# Patient Record
Sex: MALE | Race: WHITE | NOT HISPANIC OR LATINO | Employment: FULL TIME | URBAN - METROPOLITAN AREA
[De-identification: names, ages, dates, MRNs, and addresses within clinical notes are randomized per-mention and may not be internally consistent; named-entity substitution may affect disease eponyms.]

---

## 2019-08-25 ENCOUNTER — HOSPITAL ENCOUNTER (EMERGENCY)
Facility: HOSPITAL | Age: 44
Discharge: HOME | End: 2019-08-25
Attending: EMERGENCY MEDICINE | Admitting: EMERGENCY MEDICINE
Payer: COMMERCIAL

## 2019-08-25 ENCOUNTER — APPOINTMENT (EMERGENCY)
Dept: RADIOLOGY | Facility: HOSPITAL | Age: 44
End: 2019-08-25
Attending: EMERGENCY MEDICINE
Payer: COMMERCIAL

## 2019-08-25 VITALS
SYSTOLIC BLOOD PRESSURE: 129 MMHG | HEART RATE: 70 BPM | DIASTOLIC BLOOD PRESSURE: 89 MMHG | OXYGEN SATURATION: 98 % | BODY MASS INDEX: 31.52 KG/M2 | HEIGHT: 68 IN | RESPIRATION RATE: 16 BRPM | WEIGHT: 208 LBS

## 2019-08-25 DIAGNOSIS — R07.89 CHEST DISCOMFORT: Primary | ICD-10-CM

## 2019-08-25 LAB
ALBUMIN SERPL BCP-MCNC: 3.8 G/DL (ref 3.5–5)
ALP SERPL-CCNC: 47 U/L (ref 46–116)
ALT SERPL W P-5'-P-CCNC: 37 U/L (ref 12–78)
ANION GAP SERPL CALCULATED.3IONS-SCNC: 8 MMOL/L (ref 4–13)
AST SERPL W P-5'-P-CCNC: 19 U/L (ref 5–45)
BASOPHILS # BLD AUTO: 0.03 THOUSANDS/ΜL (ref 0–0.1)
BASOPHILS NFR BLD AUTO: 1 % (ref 0–1)
BILIRUB SERPL-MCNC: 0.9 MG/DL (ref 0.2–1)
BUN SERPL-MCNC: 15 MG/DL (ref 5–25)
CALCIUM SERPL-MCNC: 9 MG/DL (ref 8.3–10.1)
CHLORIDE SERPL-SCNC: 109 MMOL/L (ref 100–108)
CO2 SERPL-SCNC: 25 MMOL/L (ref 21–32)
CREAT SERPL-MCNC: 1.13 MG/DL (ref 0.6–1.3)
EOSINOPHIL # BLD AUTO: 0.13 THOUSAND/ΜL (ref 0–0.61)
EOSINOPHIL NFR BLD AUTO: 3 % (ref 0–6)
ERYTHROCYTE [DISTWIDTH] IN BLOOD BY AUTOMATED COUNT: 12.2 % (ref 11.6–15.1)
GFR SERPL CREATININE-BSD FRML MDRD: 79 ML/MIN/1.73SQ M
GLUCOSE SERPL-MCNC: 114 MG/DL (ref 65–140)
HCT VFR BLD AUTO: 44.6 % (ref 36.5–49.3)
HGB BLD-MCNC: 14.8 G/DL (ref 12–17)
IMM GRANULOCYTES # BLD AUTO: 0.03 THOUSAND/UL (ref 0–0.2)
IMM GRANULOCYTES NFR BLD AUTO: 1 % (ref 0–2)
LYMPHOCYTES # BLD AUTO: 1.27 THOUSANDS/ΜL (ref 0.6–4.47)
LYMPHOCYTES NFR BLD AUTO: 28 % (ref 14–44)
MCH RBC QN AUTO: 32 PG (ref 26.8–34.3)
MCHC RBC AUTO-ENTMCNC: 33.2 G/DL (ref 31.4–37.4)
MCV RBC AUTO: 96 FL (ref 82–98)
MONOCYTES # BLD AUTO: 0.52 THOUSAND/ΜL (ref 0.17–1.22)
MONOCYTES NFR BLD AUTO: 11 % (ref 4–12)
NEUTROPHILS # BLD AUTO: 2.62 THOUSANDS/ΜL (ref 1.85–7.62)
NEUTS SEG NFR BLD AUTO: 56 % (ref 43–75)
NRBC BLD AUTO-RTO: 0 /100 WBCS
PLATELET # BLD AUTO: 187 THOUSANDS/UL (ref 149–390)
PMV BLD AUTO: 10.9 FL (ref 8.9–12.7)
POTASSIUM SERPL-SCNC: 3.8 MMOL/L (ref 3.5–5.3)
PROT SERPL-MCNC: 7 G/DL (ref 6.4–8.2)
RBC # BLD AUTO: 4.63 MILLION/UL (ref 3.88–5.62)
SODIUM SERPL-SCNC: 142 MMOL/L (ref 136–145)
TROPONIN I SERPL-MCNC: <0.02 NG/ML
WBC # BLD AUTO: 4.6 THOUSAND/UL (ref 4.31–10.16)

## 2019-08-25 PROCEDURE — 71045 X-RAY EXAM CHEST 1 VIEW: CPT

## 2019-08-25 PROCEDURE — 93005 ELECTROCARDIOGRAM TRACING: CPT

## 2019-08-25 PROCEDURE — 80053 COMPREHEN METABOLIC PANEL: CPT | Performed by: EMERGENCY MEDICINE

## 2019-08-25 PROCEDURE — 85025 COMPLETE CBC W/AUTO DIFF WBC: CPT | Performed by: EMERGENCY MEDICINE

## 2019-08-25 PROCEDURE — 84484 ASSAY OF TROPONIN QUANT: CPT | Performed by: EMERGENCY MEDICINE

## 2019-08-25 PROCEDURE — 36415 COLL VENOUS BLD VENIPUNCTURE: CPT | Performed by: EMERGENCY MEDICINE

## 2019-08-25 PROCEDURE — 99285 EMERGENCY DEPT VISIT HI MDM: CPT

## 2019-08-25 RX ORDER — ATORVASTATIN CALCIUM 10 MG/1
10 TABLET, FILM COATED ORAL DAILY
COMMUNITY
End: 2022-06-27

## 2019-08-25 NOTE — ED PROVIDER NOTES
History  Chief Complaint   Patient presents with    Chest Pain     pt states he was sleeping and heard a popping and cracking in his chest and that's what woke him up, called the squad  pt doesn't think he is having any chest pain right now  Pt states he always felt lightheaded and laid back down and got diaphoretic     Pt in ER with c/o sudden onset of "crackling" in his chest  Symptoms were localized to the left side and didn't correlate with respiration  Pt states that the crackling woke him from sleep, and occurred a total of 8 - 10 times  He denies chest pain during the crackling and at the time of initial eval  Pt's wife states that she put her ear to his chest and also heard the noise  Pt got up, attempted to get dressed and he states that his hands got sweaty and he felt near syncopal            Prior to Admission Medications   Prescriptions Last Dose Informant Patient Reported? Taking?   atorvastatin (LIPITOR) 10 mg tablet   Yes Yes   Sig: Take 10 mg by mouth daily      Facility-Administered Medications: None       Past Medical History:   Diagnosis Date    Hyperlipidemia        Past Surgical History:   Procedure Laterality Date    CHOLECYSTECTOMY         No family history on file  I have reviewed and agree with the history as documented  Social History     Tobacco Use    Smoking status: Never Smoker   Substance Use Topics    Alcohol use: Yes     Comment: on occasion    Drug use: Never        Review of Systems   Constitutional: Negative for chills and fever  Respiratory: Negative for cough, shortness of breath and wheezing  Cardiovascular: Negative for chest pain and palpitations  Gastrointestinal: Negative for abdominal pain, constipation, diarrhea, nausea and vomiting  Genitourinary: Negative for dysuria, flank pain, hematuria and urgency  Musculoskeletal: Negative for back pain  Skin: Negative for color change and rash  Neurological: Positive for weakness     All other systems reviewed and are negative  Physical Exam  Physical Exam   Constitutional: He is oriented to person, place, and time  He appears well-developed and well-nourished  HENT:   Head: Normocephalic and atraumatic  Eyes: Pupils are equal, round, and reactive to light  EOM are normal    Cardiovascular: Normal rate, regular rhythm and normal heart sounds  Pulmonary/Chest: Effort normal and breath sounds normal  He has no decreased breath sounds  He has no wheezes  He has no rhonchi  He has no rales  Abdominal: Soft  Bowel sounds are normal  He exhibits no distension and no mass  There is no tenderness  There is no rebound and no guarding  Neurological: He is alert and oriented to person, place, and time  Skin: Skin is warm and dry  Psychiatric: His behavior is normal  Judgment and thought content normal  His affect is blunt  Nursing note and vitals reviewed        Vital Signs  ED Triage Vitals   Temp Pulse Respirations Blood Pressure SpO2   -- 08/25/19 0612 08/25/19 0612 08/25/19 0612 08/25/19 0612    60 18 132/90 99 %      Temp src Heart Rate Source Patient Position - Orthostatic VS BP Location FiO2 (%)   -- 08/25/19 0612 08/25/19 0612 08/25/19 0612 --    Monitor Lying Right arm       Pain Score       08/25/19 0609       No Pain           Vitals:    08/25/19 0612 08/25/19 0630 08/25/19 0727   BP: 132/90  129/89   Pulse: 60 66 70   Patient Position - Orthostatic VS: Lying  Lying         Visual Acuity      ED Medications  Medications - No data to display    Diagnostic Studies  Results Reviewed     Procedure Component Value Units Date/Time    Troponin I [[id number]]  (Normal) Collected:  08/25/19 0618    Lab Status:  Final result Specimen:  Blood from Arm, Left Updated:  08/25/19 0657     Troponin I <0 02 ng/mL     Comprehensive metabolic panel [[id number]]  (Abnormal) Collected:  08/25/19 0618    Lab Status:  Final result Specimen:  Blood from Arm, Left Updated:  08/25/19 0651     Sodium 142 mmol/L Potassium 3 8 mmol/L      Chloride 109 mmol/L      CO2 25 mmol/L      ANION GAP 8 mmol/L      BUN 15 mg/dL      Creatinine 1 13 mg/dL      Glucose 114 mg/dL      Calcium 9 0 mg/dL      AST 19 U/L      ALT 37 U/L      Alkaline Phosphatase 47 U/L      Total Protein 7 0 g/dL      Albumin 3 8 g/dL      Total Bilirubin 0 90 mg/dL      eGFR 79 ml/min/1 73sq m     Narrative:       [place] guidelines for Chronic Kidney Disease (CKD):     Stage 1 with normal or high GFR (GFR > 90 mL/min/1 73 square meters)    Stage 2 Mild CKD (GFR = 60-89 mL/min/1 73 square meters)    Stage 3A Moderate CKD (GFR = 45-59 mL/min/1 73 square meters)    Stage 3B Moderate CKD (GFR = 30-44 mL/min/1 73 square meters)    Stage 4 Severe CKD (GFR = 15-29 mL/min/1 73 square meters)    Stage 5 End Stage CKD (GFR <15 mL/min/1 73 square meters)  Note: GFR calculation is accurate only with a steady state creatinine    CBC and differential [[id number]] Collected:  08/25/19 0618    Lab Status:  Final result Specimen:  Blood from Arm, Left Updated:  08/25/19 0629     WBC 4 60 Thousand/uL      RBC 4 63 Million/uL      Hemoglobin 14 8 g/dL      Hematocrit 44 6 %      MCV 96 fL      MCH 32 0 pg      MCHC 33 2 g/dL      RDW 12 2 %      MPV 10 9 fL      Platelets 941 Thousands/uL      nRBC 0 /100 WBCs      Neutrophils Relative 56 %      Immat GRANS % 1 %      Lymphocytes Relative 28 %      Monocytes Relative 11 %      Eosinophils Relative 3 %      Basophils Relative 1 %      Neutrophils Absolute 2 62 Thousands/µL      Immature Grans Absolute 0 03 Thousand/uL      Lymphocytes Absolute 1 27 Thousands/µL      Monocytes Absolute 0 52 Thousand/µL      Eosinophils Absolute 0 13 Thousand/µL      Basophils Absolute 0 03 Thousands/µL                  XR chest 1 view portable   ED Interpretation by Jules Tang DO (08/25 7271)   nad                 Procedures  ECG 12 Lead Documentation Only  Date/Time: 8/25/2019 6:07 AM  Performed by: Alexandra Conte DO  Authorized by: Alexandra Conte DO     Indications / Diagnosis:  Chest discomfort  ECG reviewed by me, the ED Provider: yes    Patient location:  ED  Previous ECG:     Previous ECG:  Unavailable    Comparison to cardiac monitor: Yes    Interpretation:     Interpretation: normal    Rate:     ECG rate:  57bpm    ECG rate assessment: normal    Rhythm:     Rhythm: sinus rhythm    Ectopy:     Ectopy: none    QRS:     QRS axis:  Normal           ED Course         HEART Risk Score      Most Recent Value   History  0 Filed at: 08/25/2019 0713   ECG  0 Filed at: 08/25/2019 8027   Age  0 Filed at: 08/25/2019 2127   Risk Factors  1 Filed at: 08/25/2019 0713   Troponin  0 Filed at: 08/25/2019 0713   Heart Score Risk Calculator   History  0 Filed at: 08/25/2019 0713   ECG  0 Filed at: 08/25/2019 3713   Age  0 Filed at: 08/25/2019 2431   Risk Factors  1 Filed at: 08/25/2019 0713   Troponin  0 Filed at: 08/25/2019 4642   HEART Score  1 Filed at: 08/25/2019 2537   HEART Score  1 Filed at: 08/25/2019 7457                            MDM    Disposition  Final diagnoses:   Chest discomfort     Time reflects when diagnosis was documented in both MDM as applicable and the Disposition within this note     Time User Action Codes Description Comment    8/25/2019  7:16 AM Theron Gage Add [R07 89] Chest discomfort       ED Disposition     ED Disposition Condition Date/Time Comment    Discharge Stable Sun Aug 25, 2019  7:16 AM Treasure Kaur 27 discharge to home/self care              Follow-up Information     Follow up With Specialties Details Why Jeferson Swain MD Internal Medicine Schedule an appointment as soon as possible for a visit in 1 day for follow up Vanessa Liu 284  [phone]            Discharge Medication List as of 8/25/2019  7:18 AM      CONTINUE these medications which have NOT CHANGED    Details   atorvastatin (LIPITOR) 10 mg tablet Take 10 mg by mouth daily, Historical Med           No discharge procedures on file      ED Provider  Electronically Signed by           Joel Mclaughlin DO  08/25/19 6501

## 2019-08-26 LAB
ATRIAL RATE: 57 BPM
P AXIS: -2 DEGREES
PR INTERVAL: 148 MS
QRS AXIS: 82 DEGREES
QRSD INTERVAL: 82 MS
QT INTERVAL: 426 MS
QTC INTERVAL: 414 MS
T WAVE AXIS: 24 DEGREES
VENTRICULAR RATE: 57 BPM

## 2019-08-26 PROCEDURE — 93010 ELECTROCARDIOGRAM REPORT: CPT | Performed by: INTERNAL MEDICINE

## 2021-04-13 DIAGNOSIS — Z23 ENCOUNTER FOR IMMUNIZATION: ICD-10-CM

## 2021-04-25 ENCOUNTER — IMMUNIZATIONS (OUTPATIENT)
Dept: FAMILY MEDICINE CLINIC | Facility: HOSPITAL | Age: 46
End: 2021-04-25

## 2021-04-25 DIAGNOSIS — Z23 ENCOUNTER FOR IMMUNIZATION: Primary | ICD-10-CM

## 2021-04-25 PROCEDURE — 91300 SARS-COV-2 / COVID-19 MRNA VACCINE (PFIZER-BIONTECH) 30 MCG: CPT

## 2021-04-25 PROCEDURE — 0001A SARS-COV-2 / COVID-19 MRNA VACCINE (PFIZER-BIONTECH) 30 MCG: CPT

## 2021-05-18 ENCOUNTER — IMMUNIZATIONS (OUTPATIENT)
Dept: FAMILY MEDICINE CLINIC | Facility: HOSPITAL | Age: 46
End: 2021-05-18

## 2021-05-18 DIAGNOSIS — Z23 ENCOUNTER FOR IMMUNIZATION: Primary | ICD-10-CM

## 2021-05-18 PROCEDURE — 91300 SARS-COV-2 / COVID-19 MRNA VACCINE (PFIZER-BIONTECH) 30 MCG: CPT

## 2021-05-18 PROCEDURE — 0002A SARS-COV-2 / COVID-19 MRNA VACCINE (PFIZER-BIONTECH) 30 MCG: CPT

## 2022-06-23 ENCOUNTER — APPOINTMENT (EMERGENCY)
Dept: CT IMAGING | Facility: HOSPITAL | Age: 47
DRG: 696 | End: 2022-06-23
Payer: COMMERCIAL

## 2022-06-23 ENCOUNTER — HOSPITAL ENCOUNTER (INPATIENT)
Facility: HOSPITAL | Age: 47
LOS: 3 days | Discharge: ANOTHER HOSPITAL | DRG: 696 | End: 2022-06-27
Attending: EMERGENCY MEDICINE | Admitting: INTERNAL MEDICINE
Payer: COMMERCIAL

## 2022-06-23 ENCOUNTER — APPOINTMENT (OUTPATIENT)
Dept: CT IMAGING | Facility: HOSPITAL | Age: 47
DRG: 696 | End: 2022-06-23
Payer: COMMERCIAL

## 2022-06-23 DIAGNOSIS — N28.89 KIDNEY MASS: ICD-10-CM

## 2022-06-23 DIAGNOSIS — R31.9 HEMATURIA, UNSPECIFIED TYPE: Primary | ICD-10-CM

## 2022-06-23 DIAGNOSIS — R52 PAIN: ICD-10-CM

## 2022-06-23 DIAGNOSIS — N28.89 MASS OF RIGHT KIDNEY: ICD-10-CM

## 2022-06-23 PROBLEM — E78.2 MIXED HYPERLIPIDEMIA: Status: ACTIVE | Noted: 2019-04-22

## 2022-06-23 PROBLEM — R17 ELEVATED BILIRUBIN: Status: ACTIVE | Noted: 2022-06-23

## 2022-06-23 LAB
ALBUMIN SERPL BCP-MCNC: 4.5 G/DL (ref 3.5–5)
ALP SERPL-CCNC: 48 U/L (ref 34–104)
ALT SERPL W P-5'-P-CCNC: 27 U/L (ref 7–52)
ANION GAP SERPL CALCULATED.3IONS-SCNC: 6 MMOL/L (ref 4–13)
APTT PPP: 29 SECONDS (ref 23–37)
AST SERPL W P-5'-P-CCNC: 17 U/L (ref 13–39)
BACTERIA UR QL AUTO: ABNORMAL /HPF
BASOPHILS # BLD AUTO: 0.03 THOUSANDS/ΜL (ref 0–0.1)
BASOPHILS NFR BLD AUTO: 1 % (ref 0–1)
BILIRUB SERPL-MCNC: 1.49 MG/DL (ref 0.2–1)
BILIRUB UR QL STRIP: NEGATIVE
BUN SERPL-MCNC: 14 MG/DL (ref 5–25)
CALCIUM SERPL-MCNC: 9.1 MG/DL (ref 8.4–10.2)
CHLORIDE SERPL-SCNC: 105 MMOL/L (ref 96–108)
CK SERPL-CCNC: 105 U/L (ref 39–308)
CLARITY UR: ABNORMAL
CO2 SERPL-SCNC: 29 MMOL/L (ref 21–32)
COLOR UR: ABNORMAL
CREAT SERPL-MCNC: 1.05 MG/DL (ref 0.6–1.3)
EOSINOPHIL # BLD AUTO: 0.07 THOUSAND/ΜL (ref 0–0.61)
EOSINOPHIL NFR BLD AUTO: 1 % (ref 0–6)
ERYTHROCYTE [DISTWIDTH] IN BLOOD BY AUTOMATED COUNT: 12.7 % (ref 11.6–15.1)
GFR SERPL CREATININE-BSD FRML MDRD: 84 ML/MIN/1.73SQ M
GLUCOSE SERPL-MCNC: 118 MG/DL (ref 65–140)
GLUCOSE UR STRIP-MCNC: NEGATIVE MG/DL
HCT VFR BLD AUTO: 46.4 % (ref 36.5–49.3)
HGB BLD-MCNC: 15.9 G/DL (ref 12–17)
HGB UR QL STRIP.AUTO: ABNORMAL
IMM GRANULOCYTES # BLD AUTO: 0.04 THOUSAND/UL (ref 0–0.2)
IMM GRANULOCYTES NFR BLD AUTO: 1 % (ref 0–2)
INR PPP: 0.93 (ref 0.84–1.19)
KETONES UR STRIP-MCNC: NEGATIVE MG/DL
LACTATE SERPL-SCNC: 0.7 MMOL/L (ref 0.5–2)
LEUKOCYTE ESTERASE UR QL STRIP: NEGATIVE
LIPASE SERPL-CCNC: 87 U/L (ref 11–82)
LYMPHOCYTES # BLD AUTO: 1.19 THOUSANDS/ΜL (ref 0.6–4.47)
LYMPHOCYTES NFR BLD AUTO: 23 % (ref 14–44)
MCH RBC QN AUTO: 32.1 PG (ref 26.8–34.3)
MCHC RBC AUTO-ENTMCNC: 34.3 G/DL (ref 31.4–37.4)
MCV RBC AUTO: 94 FL (ref 82–98)
MONOCYTES # BLD AUTO: 0.43 THOUSAND/ΜL (ref 0.17–1.22)
MONOCYTES NFR BLD AUTO: 8 % (ref 4–12)
NEUTROPHILS # BLD AUTO: 3.39 THOUSANDS/ΜL (ref 1.85–7.62)
NEUTS SEG NFR BLD AUTO: 66 % (ref 43–75)
NITRITE UR QL STRIP: NEGATIVE
NON-SQ EPI CELLS URNS QL MICRO: ABNORMAL /HPF
NRBC BLD AUTO-RTO: 0 /100 WBCS
PH UR STRIP.AUTO: 6.5 [PH]
PLATELET # BLD AUTO: 225 THOUSANDS/UL (ref 149–390)
PMV BLD AUTO: 10.1 FL (ref 8.9–12.7)
POTASSIUM SERPL-SCNC: 3.8 MMOL/L (ref 3.5–5.3)
PROT SERPL-MCNC: 7.2 G/DL (ref 6.4–8.4)
PROT UR STRIP-MCNC: >=300 MG/DL
PROTHROMBIN TIME: 12.5 SECONDS (ref 11.6–14.5)
RBC # BLD AUTO: 4.96 MILLION/UL (ref 3.88–5.62)
RBC #/AREA URNS AUTO: ABNORMAL /HPF
SODIUM SERPL-SCNC: 140 MMOL/L (ref 135–147)
SP GR UR STRIP.AUTO: 1.02 (ref 1–1.03)
UROBILINOGEN UR QL STRIP.AUTO: 0.2 E.U./DL
WBC # BLD AUTO: 5.15 THOUSAND/UL (ref 4.31–10.16)
WBC #/AREA URNS AUTO: ABNORMAL /HPF

## 2022-06-23 PROCEDURE — 36415 COLL VENOUS BLD VENIPUNCTURE: CPT | Performed by: PHYSICIAN ASSISTANT

## 2022-06-23 PROCEDURE — 71250 CT THORAX DX C-: CPT

## 2022-06-23 PROCEDURE — 85025 COMPLETE CBC W/AUTO DIFF WBC: CPT | Performed by: PHYSICIAN ASSISTANT

## 2022-06-23 PROCEDURE — 85730 THROMBOPLASTIN TIME PARTIAL: CPT | Performed by: PHYSICIAN ASSISTANT

## 2022-06-23 PROCEDURE — 80053 COMPREHEN METABOLIC PANEL: CPT | Performed by: PHYSICIAN ASSISTANT

## 2022-06-23 PROCEDURE — 83605 ASSAY OF LACTIC ACID: CPT | Performed by: PHYSICIAN ASSISTANT

## 2022-06-23 PROCEDURE — 81001 URINALYSIS AUTO W/SCOPE: CPT | Performed by: PHYSICIAN ASSISTANT

## 2022-06-23 PROCEDURE — 99285 EMERGENCY DEPT VISIT HI MDM: CPT

## 2022-06-23 PROCEDURE — 82550 ASSAY OF CK (CPK): CPT | Performed by: PHYSICIAN ASSISTANT

## 2022-06-23 PROCEDURE — 74176 CT ABD & PELVIS W/O CONTRAST: CPT

## 2022-06-23 PROCEDURE — 83690 ASSAY OF LIPASE: CPT | Performed by: PHYSICIAN ASSISTANT

## 2022-06-23 PROCEDURE — 99285 EMERGENCY DEPT VISIT HI MDM: CPT | Performed by: PHYSICIAN ASSISTANT

## 2022-06-23 PROCEDURE — 85610 PROTHROMBIN TIME: CPT | Performed by: PHYSICIAN ASSISTANT

## 2022-06-23 PROCEDURE — 87086 URINE CULTURE/COLONY COUNT: CPT | Performed by: PHYSICIAN ASSISTANT

## 2022-06-23 PROCEDURE — G1004 CDSM NDSC: HCPCS

## 2022-06-23 RX ORDER — ACETAMINOPHEN 325 MG/1
650 TABLET ORAL EVERY 6 HOURS PRN
Status: DISCONTINUED | OUTPATIENT
Start: 2022-06-23 | End: 2022-06-28 | Stop reason: HOSPADM

## 2022-06-23 RX ORDER — ONDANSETRON 2 MG/ML
4 INJECTION INTRAMUSCULAR; INTRAVENOUS EVERY 6 HOURS PRN
Status: DISCONTINUED | OUTPATIENT
Start: 2022-06-23 | End: 2022-06-28 | Stop reason: HOSPADM

## 2022-06-24 ENCOUNTER — APPOINTMENT (OUTPATIENT)
Dept: MRI IMAGING | Facility: HOSPITAL | Age: 47
DRG: 696 | End: 2022-06-24
Payer: COMMERCIAL

## 2022-06-24 LAB
ABO GROUP BLD: NORMAL
ALBUMIN SERPL BCP-MCNC: 4.1 G/DL (ref 3.5–5)
ALP SERPL-CCNC: 41 U/L (ref 34–104)
ALT SERPL W P-5'-P-CCNC: 23 U/L (ref 7–52)
ANION GAP SERPL CALCULATED.3IONS-SCNC: 6 MMOL/L (ref 4–13)
AST SERPL W P-5'-P-CCNC: 15 U/L (ref 13–39)
BACTERIA UR CULT: NORMAL
BILIRUB DIRECT SERPL-MCNC: 0.15 MG/DL (ref 0–0.2)
BILIRUB SERPL-MCNC: 1.63 MG/DL (ref 0.2–1)
BLD GP AB SCN SERPL QL: NEGATIVE
BUN SERPL-MCNC: 11 MG/DL (ref 5–25)
CALCIUM SERPL-MCNC: 9.2 MG/DL (ref 8.4–10.2)
CHLORIDE SERPL-SCNC: 108 MMOL/L (ref 96–108)
CO2 SERPL-SCNC: 26 MMOL/L (ref 21–32)
CREAT SERPL-MCNC: 1.01 MG/DL (ref 0.6–1.3)
FLUAV RNA RESP QL NAA+PROBE: NEGATIVE
FLUBV RNA RESP QL NAA+PROBE: NEGATIVE
GFR SERPL CREATININE-BSD FRML MDRD: 88 ML/MIN/1.73SQ M
GLUCOSE SERPL-MCNC: 150 MG/DL (ref 65–140)
HCT VFR BLD AUTO: 43.1 % (ref 36.5–49.3)
HCT VFR BLD AUTO: 43.2 % (ref 36.5–49.3)
HGB BLD-MCNC: 15 G/DL (ref 12–17)
HGB BLD-MCNC: 15 G/DL (ref 12–17)
PLATELET # BLD AUTO: 215 THOUSANDS/UL (ref 149–390)
PMV BLD AUTO: 10.7 FL (ref 8.9–12.7)
POTASSIUM SERPL-SCNC: 3.7 MMOL/L (ref 3.5–5.3)
PROT SERPL-MCNC: 6.4 G/DL (ref 6.4–8.4)
RH BLD: POSITIVE
RSV RNA RESP QL NAA+PROBE: NEGATIVE
SARS-COV-2 RNA RESP QL NAA+PROBE: NEGATIVE
SODIUM SERPL-SCNC: 140 MMOL/L (ref 135–147)
SPECIMEN EXPIRATION DATE: NORMAL

## 2022-06-24 PROCEDURE — 80076 HEPATIC FUNCTION PANEL: CPT | Performed by: INTERNAL MEDICINE

## 2022-06-24 PROCEDURE — 85018 HEMOGLOBIN: CPT | Performed by: INTERNAL MEDICINE

## 2022-06-24 PROCEDURE — 86900 BLOOD TYPING SEROLOGIC ABO: CPT | Performed by: INTERNAL MEDICINE

## 2022-06-24 PROCEDURE — A9585 GADOBUTROL INJECTION: HCPCS | Performed by: NURSE PRACTITIONER

## 2022-06-24 PROCEDURE — 85014 HEMATOCRIT: CPT | Performed by: INTERNAL MEDICINE

## 2022-06-24 PROCEDURE — 80048 BASIC METABOLIC PNL TOTAL CA: CPT | Performed by: INTERNAL MEDICINE

## 2022-06-24 PROCEDURE — G1004 CDSM NDSC: HCPCS

## 2022-06-24 PROCEDURE — 74183 MRI ABD W/O CNTR FLWD CNTR: CPT

## 2022-06-24 PROCEDURE — 86901 BLOOD TYPING SEROLOGIC RH(D): CPT | Performed by: INTERNAL MEDICINE

## 2022-06-24 PROCEDURE — 0241U HB NFCT DS VIR RESP RNA 4 TRGT: CPT | Performed by: INTERNAL MEDICINE

## 2022-06-24 PROCEDURE — 36415 COLL VENOUS BLD VENIPUNCTURE: CPT | Performed by: INTERNAL MEDICINE

## 2022-06-24 PROCEDURE — 99222 1ST HOSP IP/OBS MODERATE 55: CPT | Performed by: UROLOGY

## 2022-06-24 PROCEDURE — 99223 1ST HOSP IP/OBS HIGH 75: CPT | Performed by: INTERNAL MEDICINE

## 2022-06-24 PROCEDURE — 86850 RBC ANTIBODY SCREEN: CPT | Performed by: INTERNAL MEDICINE

## 2022-06-24 PROCEDURE — 85049 AUTOMATED PLATELET COUNT: CPT | Performed by: INTERNAL MEDICINE

## 2022-06-24 RX ADMIN — GADOBUTROL 9 ML: 604.72 INJECTION INTRAVENOUS at 09:58

## 2022-06-24 NOTE — UTILIZATION REVIEW
Initial Clinical Review    Admission: Date/Time/Statement:  6/23/22 2219 Observation AND CHANGED 6/24/22 1041 INPATIENT RE: PATIENT PRESENTING WITH HEMATURIA FOUND TO  HAVE LARGE COMPLEX MASS IN RIGHT KIDNEY AND NEEDS ONGOING WORK UP INCLUDING MRI TO CHECK VENA CAVA FOR TUMOR THROMBUS AND POSSIBLE URGENT RIGHT SIDED RADICAL NEPHRECTOMY, POSSIBLE ROBOTIC, POSSIBLE HAND ASSIST, POSSIBLE OPEN PROCEDURE    06/24/22 1041  Inpatient Admission  Once        Transfer Service: Hospitalist       Question Answer Comment   Level of Care Med Surg    Estimated length of stay More than 2 Midnights    Certification I certify that inpatient services are medically necessary for this patient for a duration of greater than two midnights  See H&P and MD Progress Notes for additional information about the patient's course of treatment  06/24/22 1041       ED Arrival Information     Expected   -    Arrival   6/23/2022 19:53    Acuity   Urgent            Means of arrival   Walk-In    Escorted by   Spouse    Service   Hospitalist    Admission type   Urgent            Arrival complaint   blood in urine            Chief Complaint   Patient presents with    Blood in Urine     Patient complains of blood in his urine starting at 3:48 this AM  Patient states in one instance there was a clot present in the urine  Pt states no trauma to area and no history of and urologic problems  Denies urgency, frequency, or burning with urination  Initial Presentation: 55 y o  male from home to ED admitted to observation [address]  due to Mass of Right Kidney/Painless hematuria  Presented due to hematuria starting about 15 hours piror to arrival,  Has bloody urine and clots  On exam urine at bedside is red  UA > 300 protein, large occult blood  H&H 15 9/46  4  Ct abdomen showed large complex mass in right kidney  Plan is consult Urology    Monitor H&H and transfuse if hgb < 7       6/24/22 CHANGED TO INPATIENT:  Patient very worried about mass  Hematuria resolved  Exam no focal deficits  MRI showed large 13cm solid and cystic right renal mass and presumes cancer and needs surgical resection  Wants to go to [facility] for treatment  6/24/22 per Urology - Patient with gross abnormality of right kidney per imaging  Will need MRI abdomen to check vena cava for tumor thrombus  May need urgent right-sided radical nephrectomy, possible robotic, possible hand assist, possible open procedure  Trend BMP and H&H      6/25/22 - no further hematuria  Exam no focal deficits  Urology recommends transfer to [facility] for nephrectomy and biopsy  Patient prefers to be at [facility]  Patient is accepted by Dr Trista Ley at [facility]  Per patient access suspect transfer will not occur until 6/27/22 as patient needs medical insurance authorization  ED Triage Vitals [06/23/22 2003]   Temperature Pulse Respirations Blood Pressure SpO2   98 2 °F (36 8 °C) 76 18 136/95 97 %      Temp Source Heart Rate Source Patient Position - Orthostatic VS BP Location FiO2 (%)   Oral Monitor Sitting Right arm --      Pain Score       --          Wt Readings from Last 1 Encounters:   06/24/22 88 8 kg (195 lb 12 3 oz)     Additional Vital Signs:   06/25/22 0700 98 1 °F (36 7 °C) 75 18 126/85 101 98 % None (Room air) Sitting   06/24/22 2246 97 9 °F (36 6 °C) 74 18 121/82 96 95 % -- Lying   06/24/22 1515 98 5 °F (36 9 °C) 90 18 133/85 103 94 %         06/24/22 09:07:59 98 1 °F (36 7 °C) 87 16 134/94 107 93 % None (Room air) Lying   06/24/22 0545 -- 80 18 125/88 102 98 % None (Room air) Lying       Pertinent Labs/Diagnostic Test Results:   MRI abdomen w wo contrast   Final Result by Jennifer Turner DO (06/24 1052)      Large 13 cm solid and cystic right renal mass touching upon adjacent structures as above    Although conceivably this could represent an oncocytoma given absence of metastatic disease or enlarged lymphadenopathy as well as lack of aggressive local    features, however, renal cell carcinoma would look identical and cannot be distinguished by imaging alone  Surgical resection is warranted; presumed cancer until proven otherwise  Additional incidental findings as above  Workstation performed: [id number]         CT chest without contrast   Final Result by Vivek Griffith DO (06/23 2333)      No evidence of focal consolidation      Right renal mass as described on prior CT scan of the abdomen and pelvis from earlier today likely representing malignancy  Follow-up with MRI of the kidneys is recommended with contrast               Workstation performed: [id number]         CT abdomen pelvis wo contrast   Final Result by Vivek Griffith DO (06/23 2115)      Large complex mass in the right kidney highly suspicious for malignancy    Recommend MRI of the kidneys with contrast for further evaluation             I personally discussed this study with [address] on 6/23/2022 at 9:05 PM                      Workstation performed: [id number]           Results from last 7 days   Lab Units 06/24/22  1649   SARS-COV-2  Negative     Results from last 7 days   Lab Units 06/25/22  0513 06/24/22  0535 06/23/22 2024   WBC Thousand/uL 5 10  --  5 15   HEMOGLOBIN g/dL 15 8 15 0  15 0 15 9   HEMATOCRIT % 45 3 43 2  43 1 46 4   PLATELETS Thousands/uL 202 215 225   NEUTROS ABS Thousands/µL  --   --  3 39     Results from last 7 days   Lab Units 06/25/22  0513 06/24/22  0535 06/23/22 2024   SODIUM mmol/L 142 140 140   POTASSIUM mmol/L 4 3 3 7 3 8   CHLORIDE mmol/L 108 108 105   CO2 mmol/L 27 26 29   ANION GAP mmol/L 7 6 6   BUN mg/dL 13 11 14   CREATININE mg/dL 1 13 1 01 1 05   EGFR ml/min/1 73sq m 77 88 84   CALCIUM mg/dL 9 3 9 2 9 1     Results from last 7 days   Lab Units 06/25/22  0513 06/24/22  0535 06/23/22 2024   AST U/L 16 15 17   ALT U/L 23 23 27   ALK PHOS U/L 42 41 48   TOTAL PROTEIN g/dL 6 4 6 4 7 2   ALBUMIN g/dL 4 1 4 1 4 5   TOTAL BILIRUBIN mg/dL 2 01* 1 63* 1 49*   BILIRUBIN DIRECT mg/dL  --  0 15  --      Results from last 7 days   Lab Units 06/25/22  0513 06/24/22  0535 06/23/22 2024   GLUCOSE RANDOM mg/dL 102 150* 118     Results from last 7 days   Lab Units 06/23/22 2024   CK TOTAL U/L 105     Results from last 7 days   Lab Units 06/23/22 2024   PROTIME seconds 12 5   INR  0 93   PTT seconds 29     Results from last 7 days   Lab Units 06/23/22 2024   LACTIC ACID mmol/L 0 7     Results from last 7 days   Lab Units 06/23/22 2024   LIPASE u/L 87*     Results from last 7 days   Lab Units 06/23/22 2025   CLARITY UA  Cloudy   COLOR UA  Bloody   SPEC GRAV UA  1 020   PH UA  6 5   GLUCOSE UA mg/dl Negative   KETONES UA mg/dl Negative   BLOOD UA  Large*   PROTEIN UA mg/dl >=300*   NITRITE UA  Negative   BILIRUBIN UA  Negative   UROBILINOGEN UA E U /dl 0 2   LEUKOCYTES UA  Negative   WBC UA /hpf Field obscured, unable to enumerate*   RBC UA /hpf Innumerable*   BACTERIA UA /hpf None Seen   EPITHELIAL CELLS WET PREP /hpf None Seen     ED Treatment:   Medication Administration from 06/23/2022 1952 to 06/24/2022 1933     None        Past Medical History:   Diagnosis Date    Hyperlipidemia      Present on Admission:   Mixed hyperlipidemia      Admitting Diagnosis: Blood in urine [R31 9]  Kidney mass [N28 89]  Mass of right kidney [N28 89]  Hematuria, unspecified type [R31 9]  Age/Sex: 55 y o  male  Admission Orders:  Scheduled Medications:     Continuous IV Infusions: none      PRN Meds: not used   acetaminophen, 650 mg, Oral, Q6H PRN  ondansetron, 4 mg, Intravenous, Q6H PRN        IP CONSULT TO UROLOGY    Network Utilization Review Department  ATTENTION: Please call with any questions or concerns to [phone] and carefully listen to the prompts so that you are directed to the right person   All voicemails are confidential   Matilda Velazquez all requests for admission clinical reviews, approved or denied determinations and any other requests to dedicated fax number below belonging to the campus where the patient is receiving treatment   List of dedicated fax numbers for the Facilities:  [address] [place] [address] DENIALS (Administrative/Medical Necessity) [phone]   [address]  (Maternity/NICU/Pediatrics) [phone]   [address]  [address] [place] [id number] [place] [address] [address] [facility] [phone] no

## 2022-06-24 NOTE — ASSESSMENT & PLAN NOTE
POA : Painless hematuria started today with passing 2 clots  No other urinary symptoms  · CT scan of abdomen demonstrated large complex mass in the right kidney highly suspicious for malignancy  · Urology consult  · Will defer ordering MRI with contrast of the kidney to urology team if required  · Per discussion with urology,If emergent nephrectomy is needed then will have to transfer to [facility]  · Rest of plan as below

## 2022-06-24 NOTE — QUICK NOTE
Full consultation to follow later today:  Called about this patient with gross hematuria, noncontrast films reviewed, gross abnormalities of the right kidney are noted  Further staging with contrast is necessary in the form of an MRI of the abdomen pelvis with contrast to assess the vena cava for any potential tumor thrombus  This patient will require transfer to the [address] [place] [address] in the coming days and we will need to look into an urgent right-sided radical nephrectomy, possible robotic, possible hand assist, possible open procedure  Please trend his kidney function, please obtain a type and cross for 2 units of packed red blood cells as he may require transfusion while awaiting nephrectomy    Please optimize for general anesthesia and major surgery

## 2022-06-24 NOTE — ED PROVIDER NOTES
History  Chief Complaint   Patient presents with    Blood in Urine     Patient complains of blood in his urine starting at 3:48 this AM  Patient states in one instance there was a clot present in the urine  Pt states no trauma to area and no history of and urologic problems  Denies urgency, frequency, or burning with urination  Patient is a 55 YOM presenting to the ER for evaluation of hematuria  He states early this morning around 4am he developed gross hematuria  He believes it is present during his entire stream but is not entirely sure  He states every time he urinates since that time it has been bloody  Clots also present  He denies any history of this in the past  He denies any fever, chills, nausea, vomiting, diarrhea, chest pain, shortness of breath, use of blood thinners, dysuria, frequency, trauma  Patient does not smoke  History provided by:  Patient   used: No        Prior to Admission Medications   Prescriptions Last Dose Informant Patient Reported? Taking?   atorvastatin (LIPITOR) 10 mg tablet   Yes No   Sig: Take 10 mg by mouth daily      Facility-Administered Medications: None       Past Medical History:   Diagnosis Date    Hyperlipidemia        Past Surgical History:   Procedure Laterality Date    CHOLECYSTECTOMY         History reviewed  No pertinent family history  I have reviewed and agree with the history as documented  E-Cigarette/Vaping     E-Cigarette/Vaping Substances     Social History     Tobacco Use    Smoking status: Never Smoker    Smokeless tobacco: Never Used   Substance Use Topics    Alcohol use: Yes     Comment: on occasion    Drug use: Never       Review of Systems   Constitutional: Negative for chills and fever  HENT: Negative for ear pain and sore throat  Eyes: Negative for pain and visual disturbance  Respiratory: Negative for cough and shortness of breath  Cardiovascular: Negative for chest pain and palpitations  Gastrointestinal: Negative for abdominal pain and vomiting  Genitourinary: Positive for hematuria  Negative for decreased urine volume, dysuria, flank pain, frequency, penile pain, penile swelling, scrotal swelling, testicular pain and urgency  Musculoskeletal: Negative for arthralgias and back pain  Skin: Negative for color change and rash  Neurological: Negative for seizures and syncope  All other systems reviewed and are negative  Physical Exam  Physical Exam  Vitals and nursing note reviewed  Constitutional:       Appearance: He is well-developed  HENT:      Head: Normocephalic and atraumatic  Eyes:      Conjunctiva/sclera: Conjunctivae normal    Cardiovascular:      Rate and Rhythm: Normal rate and regular rhythm  Heart sounds: No murmur heard  Pulmonary:      Effort: Pulmonary effort is normal  No respiratory distress  Breath sounds: Normal breath sounds  Abdominal:      Palpations: Abdomen is soft  Tenderness: There is no abdominal tenderness  There is no right CVA tenderness or left CVA tenderness  Genitourinary:     Comments: Urine at bedside is red in color  Musculoskeletal:      Cervical back: Neck supple  Skin:     General: Skin is warm and dry  Neurological:      Mental Status: He is alert           Vital Signs  ED Triage Vitals [06/23/22 2003]   Temperature Pulse Respirations Blood Pressure SpO2   98 2 °F (36 8 °C) 76 18 136/95 97 %      Temp Source Heart Rate Source Patient Position - Orthostatic VS BP Location FiO2 (%)   Oral Monitor Sitting Right arm --      Pain Score       --           Vitals:    06/23/22 2003   BP: 136/95   Pulse: 76   Patient Position - Orthostatic VS: Sitting           ED Medications  Medications - No data to display    Diagnostic Studies  Results Reviewed     Procedure Component Value Units Date/Time    Urine Microscopic [[id number]]  (Abnormal) Collected: 06/23/22 2025    Lab Status: Final result Specimen: Urine, Clean Catch Updated: 06/23/22 2058     RBC, UA Innumerable /hpf      WBC, UA       Field obscured, unable to enumerate     /hpf     Epithelial Cells None Seen /hpf      Bacteria, UA None Seen /hpf     UA (URINE) with reflex to Scope [[id number]]  (Abnormal) Collected: 06/23/22 2025    Lab Status: Final result Specimen: Urine, Clean Catch Updated: 06/23/22 2057     Color, UA Bloody     Clarity, UA Cloudy     Specific Cobden, UA 1 020     pH, UA 6 5     Leukocytes, UA Negative     Nitrite, UA Negative     Protein, UA >=300 mg/dl      Glucose, UA Negative mg/dl      Ketones, UA Negative mg/dl      Urobilinogen, UA 0 2 E U /dl      Bilirubin, UA Negative     Occult Blood, UA Large    Protime-INR [[id number]]  (Normal) Collected: 06/23/22 2024    Lab Status: Final result Specimen: Blood from Arm, Right Updated: 06/23/22 2056     Protime 12 5 seconds      INR 0 93    APTT [[id number]]  (Normal) Collected: 06/23/22 2024    Lab Status: Final result Specimen: Blood from Arm, Right Updated: 06/23/22 2056     PTT 29 seconds     Comprehensive metabolic panel [[id number]]  (Abnormal) Collected: 06/23/22 2024    Lab Status: Final result Specimen: Blood from Arm, Left Updated: 06/23/22 2051     Sodium 140 mmol/L      Potassium 3 8 mmol/L      Chloride 105 mmol/L      CO2 29 mmol/L      ANION GAP 6 mmol/L      BUN 14 mg/dL      Creatinine 1 05 mg/dL      Glucose 118 mg/dL      Calcium 9 1 mg/dL      AST 17 U/L      ALT 27 U/L      Alkaline Phosphatase 48 U/L      Total Protein 7 2 g/dL      Albumin 4 5 g/dL      Total Bilirubin 1 49 mg/dL      eGFR 84 ml/min/1 73sq m     Narrative:      [facility] guidelines for Chronic Kidney Disease (CKD):     Stage 1 with normal or high GFR (GFR > 90 mL/min/1 73 square meters)    Stage 2 Mild CKD (GFR = 60-89 mL/min/1 73 square meters)    Stage 3A Moderate CKD (GFR = 45-59 mL/min/1 73 square meters)    Stage 3B Moderate CKD (GFR = 30-44 mL/min/1 73 square meters)    Stage 4 Severe CKD (GFR = 15-29 mL/min/1 73 square meters)    Stage 5 End Stage CKD (GFR <15 mL/min/1 73 square meters)  Note: GFR calculation is accurate only with a steady state creatinine    Lipase [[id number]]  (Abnormal) Collected: 06/23/22 2024    Lab Status: Final result Specimen: Blood from Arm, Left Updated: 06/23/22 2051     Lipase 87 u/L     CK [[id number]]  (Normal) Collected: 06/23/22 2024    Lab Status: Final result Specimen: Blood from Arm, Right Updated: 06/23/22 2051     Total  U/L     Lactic acid, plasma [[id number]]  (Normal) Collected: 06/23/22 2024    Lab Status: Final result Specimen: Blood from Arm, Right Updated: 06/23/22 2050     LACTIC ACID 0 7 mmol/L     Narrative:      Result may be elevated if tourniquet was used during collection  CBC and differential [[id number]] Collected: 06/23/22 2024    Lab Status: Final result Specimen: Blood from Arm, Right Updated: 06/23/22 2037     WBC 5 15 Thousand/uL      RBC 4 96 Million/uL      Hemoglobin 15 9 g/dL      Hematocrit 46 4 %      MCV 94 fL      MCH 32 1 pg      MCHC 34 3 g/dL      RDW 12 7 %      MPV 10 1 fL      Platelets 022 Thousands/uL      nRBC 0 /100 WBCs      Neutrophils Relative 66 %      Immat GRANS % 1 %      Lymphocytes Relative 23 %      Monocytes Relative 8 %      Eosinophils Relative 1 %      Basophils Relative 1 %      Neutrophils Absolute 3 39 Thousands/µL      Immature Grans Absolute 0 04 Thousand/uL      Lymphocytes Absolute 1 19 Thousands/µL      Monocytes Absolute 0 43 Thousand/µL      Eosinophils Absolute 0 07 Thousand/µL      Basophils Absolute 0 03 Thousands/µL     Urine culture [[id number]] Collected: 06/23/22 2025    Lab Status: In process Specimen: Urine, Clean Catch Updated: 06/23/22 2029                 CT abdomen pelvis wo contrast   Final Result by Emiliano Perez DO (06/23 2115)      Large complex mass in the right kidney highly suspicious for malignancy    Recommend MRI of the kidneys with contrast for further evaluation I personally discussed this study with Gary Lambert on 6/23/2022 at 9:05 PM                      Workstation performed: [id number]         CT chest without contrast    (Results Pending)                ED Course  ED Course as of 06/23/22 2227   Thu Jun 23, 2022 2019 Per Radiology, there is a national shortage of IV contrast, all CT scans are to be ordered without contrast at this time unless otherwise indicated by radiologist for emergent situations including stroke alerts, rule out aortic dissection, trauma alerts, or high suspicion for pulmonary embolism  Will order CT without contrast at this time  2219 Discussed case with urology    states patient can be admitted to observation at [facility] to see urology inpatient tomorrow, at this time they can discussed further treatment options including surgery, if patient needs emergency nephrectomy, he will be transferred to [facility] over the weekend   Per urology, no need for CBI at this time if patient is having no issues urinating            MDM  Number of Diagnoses or Management Options  Hematuria, unspecified type: new and requires workup  Kidney mass: new and requires workup     Amount and/or Complexity of Data Reviewed  Clinical lab tests: ordered and reviewed  Tests in the radiology section of CPT®: ordered and reviewed    Risk of Complications, Morbidity, and/or Mortality  Presenting problems: high  Diagnostic procedures: high  Management options: high    Patient Progress  Patient progress: stable      Disposition  Final diagnoses:   Hematuria, unspecified type   Kidney mass     Time reflects when diagnosis was documented in both MDM as applicable and the Disposition within this note     Time User Action Codes Description Comment    6/23/2022  9:59 PM Shell Lacy [R31 9] Hematuria, unspecified type     6/23/2022  9:59 PM Shell Lacy [N28 89] Kidney mass       ED Disposition     ED Disposition   Admit    Condition   Stable    Date/Time u Jun 23, 2022 10:19 PM    Comment   Case was discussed with BEATRIZ and the patient's admission status was agreed to be Admission Status: observation status to the service of Dr Patrick Mcmahon   Follow-up Information    None         Patient's Medications   Discharge Prescriptions    No medications on file       No discharge procedures on file      PDMP Review     None          ED Provider  Electronically Signed by           Samuel Mcbride PA-C  06/23/22 1280

## 2022-06-24 NOTE — ASSESSMENT & PLAN NOTE
Painless hematuria started today with passing 2 clots  No other urinary symptoms  CT scan of abdomen demonstrated large complex mass in the right kidney highly suspicious for malignancy  Urology on board  MRI w con ordered for potential staging  Needs to be transferred to [facility], discussed with PACS, pending bed    Patient and wife do express desire to continue care with [facility], discussed urgency in obtaining diagnosis  Also discussed with CM and Urology

## 2022-06-24 NOTE — UTILIZATION REVIEW
Observation Admission Authorization Request   NOTIFICATION OF OBSERVATION ADMISSION/OBSERVATION AUTHORIZATION REQUEST   SERVICING FACILITY:   [facility]  [facility] [address]  Tax ID: [id number]  NPI: [id number]  Place of Service: On [address] [facility] Code: 22  CPT Code for Observation: CPT   CPT 38423     ATTENDING PROVIDER:  Attending Name and NPI#: [id number], [place] [[id number]]  Address: César Velasco  [address]  Phone: [phone]     UTILIZATION REVIEW CONTACT:  Cyndi Waggoner, Utilization   Network Utilization Review Department  Phone: [phone]  Fax: [phone]  Email: [email or web address]     PHYSICIAN ADVISORY SERVICES:  FOR EFMY-HB-OBCZ REVIEW - MEDICAL NECESSITY DENIAL  Phone: [phone]  Fax: [phone]  Email: [email or web address]     TYPE OF REQUEST:  Observation  Status     ADMISSION INFORMATION:  ADMISSION DATE/TIME:   PATIENT DIAGNOSIS CODE/DESCRIPTION:  Blood in urine [R31 9]  Kidney mass [N28 89]  Mass of right kidney [N28 89]  Hematuria, unspecified type [R31 9]  DISCHARGE DATE/TIME: No discharge date for patient encounter  IMPORTANT INFORMATION:  Please contact Cyndi Waggoner directly with any questions or concerns regarding this request  Department voicemails are confidential     Send requests for admission clinical reviews, concurrent reviews, approvals, and administrative denials due to lack of clinical to fax [phone]

## 2022-06-24 NOTE — H&P
[facility]  [facility]- [address] [place] [address] 1975, 55 y o  male MRN: [id number]  Unit/Bed#: ED 24 Encounter: [id number]  Primary Care Provider: Gerry Gomez MD   Date and time admitted to hospital: 6/23/2022  7:56 PM    * Mass of right kidney  Assessment & Plan  POA : Painless hematuria started today with passing 2 clots  No other urinary symptoms  · CT scan of abdomen demonstrated large complex mass in the right kidney highly suspicious for malignancy  · Urology consult  · Will defer ordering MRI with contrast of the kidney to urology team if required  · Per discussion with urology,If emergent nephrectomy is needed then will have to transfer to [facility]  · Rest of plan as below  Painless hematuria  Assessment & Plan  · Hemoglobin within normal range  · UA showed innumerable RBC and negative for infection  · Type and screen  · Monitor H/H and transfuse for hemoglobin less than 7  Consent is signed  · Per discussion with urology, no need for norris catheter with irrigation for now as long as the patient is passing urine with no difficulty  Will contact urology for any changes  Mixed hyperlipidemia  Assessment & Plan  · Not taking statins anymore  Elevated bilirubin  Assessment & Plan  · Possibly reactive  Denies any GI symptoms  · Monitor  VTE Prophylaxis: Pharmacologic VTE Prophylaxis contraindicated due to hematuria  / sequential compression device   Code Status: Full code  POLST: POLST form is not discussed and not completed at this time  Anticipated Length of Stay:  Patient will be admitted on an Observation basis with an anticipated length of stay of  Less than 2 midnights  Justification for Hospital Stay: Hematuria and renal mass requiring urology consult  Chief Complaint:   Hematuria  History of Present Illness:    [address] [place] [address] is a 55 y o  male with past medical history of hyperlipidemia who presents with painless hematuria started today    He reports passing clots on 2 occasions  He denies other urinary complaints  He denies weight loss  No prior history of the same  He denies any other complaints  On admission, CT abdomen is notable for large complex mass in the right kidney suspicious for malignancy  Patient remains hemodynamically stable  Laboratory workup is notable for normal hemoglobin level  Per ED discussion with Urology, no need for norris catheter placement at this time as long as the patient is passing urine without difficulty  He denies family history of renal cancer  Patient will be admitted under [facility] service due to the above  Urology will be consulted for further assistance with the management  Review of Systems:    Review of Systems   Constitutional: Negative for chills, fatigue and fever  HENT: Negative for ear pain and sore throat  Eyes: Negative for pain and visual disturbance  Respiratory: Negative for cough and shortness of breath  Cardiovascular: Negative for chest pain and palpitations  Gastrointestinal: Negative for abdominal pain and vomiting  Genitourinary: Positive for hematuria  Negative for difficulty urinating, dysuria and flank pain  Musculoskeletal: Negative for arthralgias and back pain  Skin: Negative for color change and rash  Neurological: Negative for seizures and syncope  Psychiatric/Behavioral: Negative for agitation and confusion  All other systems reviewed and are negative  Past Medical and Surgical History:     Past Medical History:   Diagnosis Date    Hyperlipidemia        Past Surgical History:   Procedure Laterality Date    CHOLECYSTECTOMY         Meds/Allergies:    Prior to Admission medications    Medication Sig Start Date End Date Taking? Authorizing Provider   atorvastatin (LIPITOR) 10 mg tablet Take 10 mg by mouth daily    Historical Provider, MD     I have reviewed home medications with patient personally      Allergies: No Known Allergies    Social History: Marital Status: /Civil Union   Occupation:   Patient Pre-hospital Living Situation:  Home with family  Patient Pre-hospital Level of Mobility:  Ambulatory  Patient Pre-hospital Diet Restrictions:   Substance Use History:   Social History     Substance and Sexual Activity   Alcohol Use Yes    Comment: on occasion     Social History     Tobacco Use   Smoking Status Never Smoker   Smokeless Tobacco Never Used     Social History     Substance and Sexual Activity   Drug Use Never       Family History:    History reviewed  No pertinent family history  Physical Exam:     Vitals:   Blood Pressure: 136/95 (06/23/22 2003)  Pulse: 76 (06/23/22 2003)  Temperature: 98 2 °F (36 8 °C) (06/23/22 2003)  Temp Source: Oral (06/23/22 2003)  Respirations: 18 (06/23/22 2003)  SpO2: 97 % (06/23/22 2003)    Physical Exam  Vitals and nursing note reviewed  Constitutional:       General: He is not in acute distress  Appearance: He is not ill-appearing or toxic-appearing  HENT:      Head: Normocephalic and atraumatic  Cardiovascular:      Rate and Rhythm: Normal rate and regular rhythm  Pulmonary:      Effort: Pulmonary effort is normal  No respiratory distress  Breath sounds: Normal breath sounds  Abdominal:      General: Bowel sounds are normal  There is no distension  Palpations: Abdomen is soft  Tenderness: There is no abdominal tenderness  Musculoskeletal:      Right lower leg: No edema  Left lower leg: No edema  Skin:     Coloration: Skin is not jaundiced or pale  Neurological:      General: No focal deficit present  Mental Status: He is alert and oriented to person, place, and time  Psychiatric:         Behavior: Behavior normal              Additional Data:     Lab Results: I have personally reviewed pertinent reports        Results from last 7 days   Lab Units 06/23/22 2024   WBC Thousand/uL 5 15   HEMOGLOBIN g/dL 15 9   HEMATOCRIT % 46 4   PLATELETS Thousands/uL 225 NEUTROS PCT % 66   LYMPHS PCT % 23   MONOS PCT % 8   EOS PCT % 1     Results from last 7 days   Lab Units 06/23/22 2024   POTASSIUM mmol/L 3 8   CHLORIDE mmol/L 105   CO2 mmol/L 29   BUN mg/dL 14   CREATININE mg/dL 1 05   CALCIUM mg/dL 9 1   ALK PHOS U/L 48   ALT U/L 27   AST U/L 17     Results from last 7 days   Lab Units 06/23/22 2024   INR  0 93       Imaging: I have personally reviewed pertinent reports  CT abdomen pelvis wo contrast    Result Date: 6/23/2022  Narrative: CT ABDOMEN AND PELVIS WITHOUT IV CONTRAST INDICATION:   Hematuria, gross/macroscopic gross hematuria  COMPARISON:  None  TECHNIQUE:  CT examination of the abdomen and pelvis was performed without intravenous contrast  This examination was performed without intravenous contrast in the context of the critical nationwide Omnipaque shortage  Axial, sagittal, and coronal 2D reformatted images were created from the source data and submitted for interpretation  Radiation dose length product (DLP) for this visit:  548 mGy-cm   This examination, like all CT scans performed in the [facility], was performed utilizing techniques to minimize radiation dose exposure, including the use of iterative reconstruction and automated exposure control  Enteric contrast was not administered  FINDINGS: ABDOMEN LOWER CHEST:  No clinically significant abnormality identified in the visualized lower chest  LIVER/BILIARY TREE:  Unremarkable  GALLBLADDER:  Gallbladder is surgically absent  SPLEEN:  Unremarkable  PANCREAS:  Unremarkable  ADRENAL GLANDS:  Unremarkable  KIDNEYS/URETERS:  There is a large complex mass in the right kidney measuring approximately 11 9 x 10 6 cm  Nonobstructing right-sided intrarenal calculi is seen  STOMACH AND BOWEL:  There is colonic diverticulosis without evidence of acute diverticulitis  APPENDIX:  No findings to suggest appendicitis  ABDOMINOPELVIC CAVITY:  No ascites  No pneumoperitoneum  No lymphadenopathy  VESSELS:  Unremarkable for patient's age  PELVIS REPRODUCTIVE ORGANS:  Unremarkable for patient's age  URINARY BLADDER:  Unremarkable  ABDOMINAL WALL/INGUINAL REGIONS:  Unremarkable  OSSEOUS STRUCTURES:  No acute fracture or destructive osseous lesion  Impression: Large complex mass in the right kidney highly suspicious for malignancy  Recommend MRI of the kidneys with contrast for further evaluation  I personally [facility] this study [facility] on 6/23/2022 at 9:05 PM  Workstation performed: [id number]       EKG, Pathology, and Other Studies Reviewed on Admission:   · Reviewed    [facility] / Care Everywhere Records Reviewed: Yes     ** Please Note: This note has been constructed using a voice recognition system   **

## 2022-06-24 NOTE — ASSESSMENT & PLAN NOTE
· Hemoglobin within normal range  · UA showed innumerable RBC and negative for infection  · Type and screen  · Monitor H/H and transfuse for hemoglobin less than 7  Consent is signed  · Per discussion with urology, no need for norris catheter with irrigation for now as long as the patient is passing urine with no difficulty  Will contact urology for any changes

## 2022-06-24 NOTE — CONSULTS
CONSULT    Patient Name: Rosanna Powell  Patient MRN: [id number]  Date of Service: 6/24/2022   Date / Time Note Created: 6/24/2022 9:29 AM   Referring Provider: Thi*    Provider Creating Note: CROW Lopez    PCP: Issa Selby  Attending Provider:  Thi*    Reason for Consult: Renal Mass    HPI:  Rosanna Powell is a 58-year-old male with history of varicocele in use, no recent urologic consultation, evaluation or  surgical manipulation in many years, presenting to [address] [place] [address] emergency room after sudden onset of gross hematuria early yesterday morning; not accompanied by fever, chills, dysuria, flank, abdominal, suprapubic pain or nausea/vomiting  Patient denies history of nephrolithiasis, frequent UTI or chronic irritative obstructive urinary symptoms  He is in lifelong nonsmoker without prior occupational exposures or familial history of malignancy  He is afebrile, hemodynamically stable and in no apparent distress  Urinalysis obtained was negative for leukocytes or nitrites  There were innumerable RBCs  Renal function within normal limits  Normal lactic acid  Hemoglobin exceeding 15  No leukocytosis  There was an incidental CT finding of a large complex mass involving the right renal unit  Follow-up MRI demonstrated large 13 cm solid cystic right renal mass measuring approximately 13 x 11 x 9 7  There is mass effect on the descending duodenum and under service of splenic flexure without evidence of colonic invasion  The lesion S faces IVC without evidence of direct extension/invasion  Left renal unit demonstrates renal cyst but is otherwise unremarkable  There was no evidence of lymphadenopathy or suspicious osseous lesions               Active Problems:    Patient Active Problem List   Diagnosis    Painless hematuria    Mass of right kidney    Elevated bilirubin    Mixed hyperlipidemia             Impressions  · Large right renal mass--solid cystic lesion measuring 13 1 x 11 2 x 9 7 cm  · Gross hematuria--painless  Now resolved  Secondary to previous  Patient remains hemodynamically stable  Recommendations  Initially recommended and offered transfer to [facility] after imaging completed here at [facility] to connect with  attending and surgeon on-call, Dr Benard Paget who will in turn repair for nephrectomy  However, patient has elected to proceed to [address] [place] [address] after discussing with family and friends  Will facilitate discussion between [facility] urology attending and [address] [place] [address]  attending  At this juncture, it will be difficult to predict how  attending at [facility] wishes to proceed, particularly given patient's stability  There is a potential for discharge with follow-up at [address] [place] [address]  Logistical plan is pending discussion between our services  Will update patient an healthcare team accordingly  Is no requirement for urgent/acute or emergent  surgical intervention  Past Medical History:   Diagnosis Date    Hyperlipidemia        Past Surgical History:   Procedure Laterality Date    CHOLECYSTECTOMY         History reviewed  No pertinent family history      Social History     Socioeconomic History    Marital status: /Civil Union     Spouse name: Not on file    Number of children: Not on file    Years of education: Not on file    Highest education level: Not on file   Occupational History    Not on file   Tobacco Use    Smoking status: Never Smoker    Smokeless tobacco: Never Used   Substance and Sexual Activity    Alcohol use: Yes     Comment: on occasion    Drug use: Never    Sexual activity: Not on file   Other Topics Concern    Not on file   Social History Narrative    Not on file     Social Determinants of Health     Financial Resource Strain: Not on file   Food Insecurity: Not on file   Transportation Needs: Not on file Physical Activity: Not on file   Stress: Not on file   Social Connections: Not on file   Intimate Partner Violence: Not on file   Housing Stability: Not on file       No Known Allergies    Review of Systems  10 point review of systems negative except as noted in HPI  Constitutional:   negative for - chills or fever  Hematological and Lymphatic:   negative  Cardiovascular:   no chest pain or dyspnea on exertion  Gastrointestinal:   no abdominal pain, change in bowel habits, or black or bloody stools  Genito-Urinary:   positive for - hematuria  negative for - change in urinary stream, dysuria, incontinence or pelvic pain  Neurological:   no TIA or stroke symptoms     Chart Review   Allergies, current medications, history, problem list    Vital Signs  /94 (BP Location: Left arm)   Pulse 87   Temp 98 1 °F (36 7 °C) (Oral)   Resp 16   SpO2 93%     Physical Exam  General appearance: alert and oriented, in no acute distress, appears stated age, cooperative and no distress  Head: Normocephalic, without obvious abnormality, atraumatic  Neck: no adenopathy, no carotid bruit, no JVD, supple, symmetrical, trachea midline and thyroid not enlarged, symmetric, no tenderness/mass/nodules  Lungs: clear to auscultation bilaterally  Heart: regular rate and rhythm, S1, S2 normal, no murmur, click, rub or gallop  Abdomen: soft, non-tender; bowel sounds normal; no masses,  no organomegaly  Extremities: extremities normal, warm and well-perfused; no cyanosis, clubbing, or edema  Pulses: 2+ and symmetric  Neurologic: Grossly normal  No urinary drains  Clear yellow urine in urinal   See below              Laboratory Studies  Lab Results   Component Value Date    K 3 7 06/24/2022     06/24/2022    CO2 26 06/24/2022    CREATININE 1 01 06/24/2022    BUN 11 06/24/2022     Lab Results   Component Value Date    WBC 5 15 06/23/2022    RBC 4 96 06/23/2022    HGB 15 0 06/24/2022    HGB 15 0 06/24/2022    HCT 43 1 06/24/2022    HCT 43 2 06/24/2022    MCV 94 06/23/2022    MCH 32 1 06/23/2022    RDW 12 7 06/23/2022     06/24/2022       Imaging and Other Studies  )  CT abdomen pelvis wo contrast    Result Date: 6/23/2022  Narrative: CT ABDOMEN AND PELVIS WITHOUT IV CONTRAST INDICATION:   Hematuria, gross/macroscopic gross hematuria  COMPARISON:  None  TECHNIQUE:  CT examination of the abdomen and pelvis was performed without intravenous contrast  This examination was performed without intravenous contrast in the context of the critical nationwide Omnipaque shortage  Axial, sagittal, and coronal 2D reformatted images were created from the source data and submitted for interpretation  Radiation dose length product (DLP) for this visit:  548 mGy-cm   This examination, like all CT scans performed in the [facility], was performed utilizing techniques to minimize radiation dose exposure, including the use of iterative reconstruction and automated exposure control  Enteric contrast was not administered  FINDINGS: ABDOMEN LOWER CHEST:  No clinically significant abnormality identified in the visualized lower chest  LIVER/BILIARY TREE:  Unremarkable  GALLBLADDER:  Gallbladder is surgically absent  SPLEEN:  Unremarkable  PANCREAS:  Unremarkable  ADRENAL GLANDS:  Unremarkable  KIDNEYS/URETERS:  There is a large complex mass in the right kidney measuring approximately 11 9 x 10 6 cm  Nonobstructing right-sided intrarenal calculi is seen  STOMACH AND BOWEL:  There is colonic diverticulosis without evidence of acute diverticulitis  APPENDIX:  No findings to suggest appendicitis  ABDOMINOPELVIC CAVITY:  No ascites  No pneumoperitoneum  No lymphadenopathy  VESSELS:  Unremarkable for patient's age  PELVIS REPRODUCTIVE ORGANS:  Unremarkable for patient's age  URINARY BLADDER:  Unremarkable  ABDOMINAL WALL/INGUINAL REGIONS:  Unremarkable  OSSEOUS STRUCTURES:  No acute fracture or destructive osseous lesion       Impression: Large complex mass in the right kidney highly suspicious for malignancy  Recommend MRI of the kidneys with contrast for further evaluation  I personally discussed this study with Gary Lambert on 6/23/2022 at 9:05 PM  Workstation performed: [id number]     CT chest without contrast    Result Date: 6/23/2022  Narrative: CT CHEST WITHOUT IV CONTRAST INDICATION:   CT concerning for malignancy of kidney, r/o mets to chest  COMPARISON:  None  TECHNIQUE: CT examination of the chest was performed without intravenous contrast  This examination was performed without intravenous contrast in the context of the critical nationwide Omnipaque shortage  Axial, sagittal, and coronal 2D reformatted images were created from the source data and submitted for interpretation  Radiation dose length product (DLP) for this visit:  359 mGy-cm   This examination, like all CT scans performed in the [facility], was performed utilizing techniques to minimize radiation dose exposure, including the use of iterative reconstruction and automated exposure control  FINDINGS: LUNGS:  Lungs are clear  There is no tracheal or endobronchial lesion  PLEURA:  Unremarkable  HEART/GREAT VESSELS: Heart is unremarkable for patient's age  No thoracic aortic aneurysm  MEDIASTINUM AND COLBY:  Unremarkable  CHEST WALL AND LOWER NECK:  Unremarkable  VISUALIZED STRUCTURES IN THE UPPER ABDOMEN:  Please refer to concurrent CT scan of the abdomen and pelvis for full evaluation of the abdomen and pelvis  OSSEOUS STRUCTURES:  No acute fracture or destructive osseous lesion  Impression: No evidence of focal consolidation Right renal mass as described on prior CT scan of the abdomen and pelvis from earlier today likely representing malignancy    Follow-up with MRI of the kidneys is recommended with contrast Workstation performed: [id number]       Medications   Current Facility-Administered Medications   Medication Dose Route Frequency Provider Last Rate    acetaminophen  650 mg Oral Q6H PRN King Dodge MD      ondansetron  4 mg Intravenous Q6H PRN MD Shannan Patel CRNP

## 2022-06-24 NOTE — PROGRESS NOTES
[facility]  Progress Note Alisha Kaur 27 1975, 55 y o  male MRN: [id number]  Unit/Bed#: S -01 Encounter: [id number]  Primary Care Provider: Amado Borrero MD   Date and time admitted to hospital: 2022  7:56 PM    * Mass of right kidney  Assessment & Plan  Painless hematuria started today with passing 2 clots  No other urinary symptoms  CT scan of abdomen demonstrated large complex mass in the right kidney highly suspicious for malignancy  Urology on board  MRI w con ordered for potential staging  Needs to be transferred to [facility], discussed with PACS, pending bed    Patient and wife do express desire to continue care with [facility], discussed urgency in obtaining diagnosis  Also discussed with CM and Urology  Painless hematuria  Assessment & Plan  As above  resolved    Mixed hyperlipidemia  Assessment & Plan  Not taking statins anymore  Elevated bilirubin  Assessment & Plan  Possibly reactive  Denies any GI symptoms  Monitor prn        VTE Pharmacologic Prophylaxis: VTE Score: 4 Moderate Risk (Score 3-4) - Pharmacological DVT Prophylaxis Contraindicated  Sequential Compression Devices Ordered  Patient Centered Rounds: I performed bedside rounds with nursing staff today  Discussions with Specialists or Other Care Team Provider: urology    Education and Discussions with Family / Patient: Updated  (wife) at bedside  Current Length of Stay: 0 day(s)  Current Patient Status: Inpatient   Discharge Plan: pending [facility] bed    Code Status: Level 1 - Full Code    Subjective:   No more hematuria  Concerned about mass and would like to go to [place] where he is confident he will receive best care       Objective:     Vitals:   Temp (24hrs), Av 2 °F (36 8 °C), Min:98 1 °F (36 7 °C), Max:98 2 °F (36 8 °C)    Temp:  [98 1 °F (36 7 °C)-98 2 °F (36 8 °C)] 98 1 °F (36 7 °C)  HR:  [76-87] 87  Resp:  [16-18] 16  BP: (125-136)/(88-95) 134/94  SpO2:  [93 %-98 %] 93 %  There is no height or weight on file to calculate BMI  Input and Output Summary (last 24 hours): Intake/Output Summary (Last 24 hours) at 6/24/2022 1246  Last data filed at 6/24/2022 0230  Gross per 24 hour   Intake --   Output 245 ml   Net -245 ml       Physical Exam:   Physical Exam  Vitals and nursing note reviewed  Constitutional:       General: He is not in acute distress  Appearance: Normal appearance  He is well-developed  He is not ill-appearing, toxic-appearing or diaphoretic  HENT:      Head: Normocephalic and atraumatic  Cardiovascular:      Rate and Rhythm: Normal rate and regular rhythm  Pulses: Normal pulses  Heart sounds: Normal heart sounds  No murmur heard  Pulmonary:      Effort: Pulmonary effort is normal  No respiratory distress  Breath sounds: Normal breath sounds  Abdominal:      General: Bowel sounds are normal  There is no distension  Palpations: Abdomen is soft  Tenderness: There is no abdominal tenderness  Musculoskeletal:      Cervical back: Neck supple  Right lower leg: No edema  Left lower leg: No edema  Skin:     General: Skin is warm and dry  Neurological:      Mental Status: He is alert and oriented to person, place, and time  Mental status is at baseline  Psychiatric:         Mood and Affect: Mood normal          Behavior: Behavior normal          Thought Content:  Thought content normal          Judgment: Judgment normal           Additional Data:     Labs:  Results from last 7 days   Lab Units 06/24/22  0535 06/23/22 2024   WBC Thousand/uL  --  5 15   HEMOGLOBIN g/dL 15 0  15 0 15 9   HEMATOCRIT % 43 2  43 1 46 4   PLATELETS Thousands/uL 215 225   NEUTROS PCT %  --  66   LYMPHS PCT %  --  23   MONOS PCT %  --  8   EOS PCT %  --  1     Results from last 7 days   Lab Units 06/24/22  0535   SODIUM mmol/L 140   POTASSIUM mmol/L 3 7   CHLORIDE mmol/L 108   CO2 mmol/L 26   BUN mg/dL 11   CREATININE mg/dL 1 01   ANION GAP mmol/L 6   CALCIUM mg/dL 9 2   ALBUMIN g/dL 4 1   TOTAL BILIRUBIN mg/dL 1 63*   ALK PHOS U/L 41   ALT U/L 23   AST U/L 15   GLUCOSE RANDOM mg/dL 150*     Results from last 7 days   Lab Units 06/23/22 2024   INR  0 93             Results from last 7 days   Lab Units 06/23/22 2024   LACTIC ACID mmol/L 0 7       Lines/Drains:  Invasive Devices  Report    Peripheral Intravenous Line  Duration           Peripheral IV 06/23/22 Right Antecubital <1 day                      Imaging: Reviewed radiology reports from this admission including: chest xray and abdominal/pelvic CT    Recent Cultures (last 7 days):         Last 24 Hours Medication List:   Current Facility-Administered Medications   Medication Dose Route Frequency Provider Last Rate    acetaminophen  650 mg Oral Q6H PRN King Dodge MD      ondansetron  4 mg Intravenous Q6H PRN Vlad Morse MD          Today, Patient Was Seen By: Lory Menendez MD    **Please Note: This note may have been constructed using a voice recognition system  **

## 2022-06-25 LAB
ALBUMIN SERPL BCP-MCNC: 4.1 G/DL (ref 3.5–5)
ALP SERPL-CCNC: 42 U/L (ref 34–104)
ALT SERPL W P-5'-P-CCNC: 23 U/L (ref 7–52)
ANION GAP SERPL CALCULATED.3IONS-SCNC: 7 MMOL/L (ref 4–13)
AST SERPL W P-5'-P-CCNC: 16 U/L (ref 13–39)
BILIRUB SERPL-MCNC: 2.01 MG/DL (ref 0.2–1)
BUN SERPL-MCNC: 13 MG/DL (ref 5–25)
CALCIUM SERPL-MCNC: 9.3 MG/DL (ref 8.4–10.2)
CHLORIDE SERPL-SCNC: 108 MMOL/L (ref 96–108)
CO2 SERPL-SCNC: 27 MMOL/L (ref 21–32)
CREAT SERPL-MCNC: 1.13 MG/DL (ref 0.6–1.3)
ERYTHROCYTE [DISTWIDTH] IN BLOOD BY AUTOMATED COUNT: 12.7 % (ref 11.6–15.1)
GFR SERPL CREATININE-BSD FRML MDRD: 77 ML/MIN/1.73SQ M
GLUCOSE SERPL-MCNC: 102 MG/DL (ref 65–140)
HCT VFR BLD AUTO: 45.3 % (ref 36.5–49.3)
HGB BLD-MCNC: 15.8 G/DL (ref 12–17)
MCH RBC QN AUTO: 32.7 PG (ref 26.8–34.3)
MCHC RBC AUTO-ENTMCNC: 34.9 G/DL (ref 31.4–37.4)
MCV RBC AUTO: 94 FL (ref 82–98)
PLATELET # BLD AUTO: 202 THOUSANDS/UL (ref 149–390)
PMV BLD AUTO: 10.6 FL (ref 8.9–12.7)
POTASSIUM SERPL-SCNC: 4.3 MMOL/L (ref 3.5–5.3)
PROT SERPL-MCNC: 6.4 G/DL (ref 6.4–8.4)
RBC # BLD AUTO: 4.83 MILLION/UL (ref 3.88–5.62)
SODIUM SERPL-SCNC: 142 MMOL/L (ref 135–147)
WBC # BLD AUTO: 5.1 THOUSAND/UL (ref 4.31–10.16)

## 2022-06-25 PROCEDURE — 85027 COMPLETE CBC AUTOMATED: CPT | Performed by: INTERNAL MEDICINE

## 2022-06-25 PROCEDURE — 80053 COMPREHEN METABOLIC PANEL: CPT | Performed by: INTERNAL MEDICINE

## 2022-06-25 PROCEDURE — 99232 SBSQ HOSP IP/OBS MODERATE 35: CPT | Performed by: INTERNAL MEDICINE

## 2022-06-25 NOTE — ASSESSMENT & PLAN NOTE
Painless hematuria started on the day of admission with passing 2 clots  Eventually resolved  No other urinary symptoms  CT scan of abdomen demonstrated large complex mass in the right kidney highly suspicious for malignancy  Urology on board  MRI:  Right renal mass  Our urologist initially recommended transfer to [facility] for nephrectomy and biopsy  However, patient prefers to be transferred and be managed at [facility]   Thus this was coordinated yesterday by our advance practitioner for Urology with the patient access staff  Patient was accepted by Dr Tabitha Klein at [place] [address]  Today, 6/25, spoke to patient access staff, transfer likely will happen on Monday as [facility] medical insurance authorization  Spoke to the patient about this and okay with the plan

## 2022-06-25 NOTE — ASSESSMENT & PLAN NOTE
As per discussion with the patient, chronic  Patient also had elevated bilirubin in 2019  More of indirect hyperbilirubinemia  Asymptomatic  Thus possibly Gilbert's syndrome  Possibly reactive  Denies any GI symptoms  This was discussed with the patient    Monitor prn

## 2022-06-25 NOTE — PROGRESS NOTES
[facility]  Progress Note Threasa Rater Mal Kaur 27 1975, 55 y o  male MRN: [id number]  Unit/Bed#: S -01 Encounter: [id number]  Primary Care Provider: Rosanna Martel MD   Date and time admitted to hospital: 6/23/2022  7:56 PM    * Mass of right kidney  Assessment & Plan  Painless hematuria started on the day of admission with passing 2 clots  Eventually resolved  No other urinary symptoms  CT scan of abdomen demonstrated large complex mass in the right kidney highly suspicious for malignancy  Urology on board  MRI:  Right renal mass  Our urologist initially recommended transfer to [facility] for nephrectomy and biopsy  However, patient prefers to be transferred and be managed at [facility]   Thus this was coordinated yesterday by our advance practitioner for Urology with the patient access staff  Patient was accepted by Dr Randy Moss at [place]  Today, 6/25, spoke to patient access staff, transfer likely will happen on Monday as [facility] medical insurance authorization  Spoke to the patient about this and okay with the plan  Painless hematuria  Assessment & Plan  As above  resolved    Mixed hyperlipidemia  Assessment & Plan  Not taking statins anymore  Elevated bilirubin  Assessment & Plan  As per discussion with the patient, chronic  Patient also had elevated bilirubin in 2019  More of indirect hyperbilirubinemia  Asymptomatic  Thus possibly Gilbert's syndrome  Possibly reactive  Denies any GI symptoms  This was discussed with the patient  Monitor prn        VTE Pharmacologic Prophylaxis: VTE Score: 4 Moderate Risk (Score 3-4) - Pharmacological DVT Prophylaxis Contraindicated  Sequential Compression Devices Ordered  Patient Centered Rounds: I performed bedside rounds with nursing staff today  Discussions with Specialists or Other Care Team Provider:  Case management  Patient access staff      Education and Discussions with Family / Patient: Updated  (wife) at bedside  Time Spent for Care: 30 minutes  More than 50% of total time spent on counseling and coordination of care as described above  Current Length of Stay: 1 day(s)  Current Patient Status: Inpatient   Certification Statement: The patient will continue to require additional inpatient hospital stay due to Above findings and plans  Discharge Plan: Anticipate discharge in 48 hrs to [address] Status: Level 1 - Full Code    Subjective:   Patient is doing fine  No complaints  Patient denies any pains  No more gross hematuria  Objective:     Vitals:   Temp (24hrs), Av 1 °F (36 7 °C), Min:97 9 °F (36 6 °C), Max:98 2 °F (36 8 °C)    Temp:  [97 9 °F (36 6 °C)-98 2 °F (36 8 °C)] 98 2 °F (36 8 °C)  HR:  [74-78] 78  Resp:  [18] 18  BP: (113-126)/(68-85) 113/68  SpO2:  [94 %-98 %] 94 %  There is no height or weight on file to calculate BMI  Input and Output Summary (last 24 hours): Intake/Output Summary (Last 24 hours) at 2022 1749  Last data filed at 2022 1504  Gross per 24 hour   Intake 444 ml   Output 850 ml   Net -406 ml       Physical Exam:   Physical Exam  Vitals and nursing note reviewed  Exam conducted with a chaperone present  Constitutional:       General: He is not in acute distress  Appearance: Normal appearance  He is not ill-appearing, toxic-appearing or diaphoretic  Cardiovascular:      Rate and Rhythm: Normal rate and regular rhythm  Heart sounds: Normal heart sounds  Pulmonary:      Effort: Pulmonary effort is normal  No respiratory distress  Breath sounds: Normal breath sounds  Abdominal:      General: Bowel sounds are normal  There is no distension  Palpations: Abdomen is soft  Tenderness: There is no abdominal tenderness  There is no right CVA tenderness, left CVA tenderness or guarding  Musculoskeletal:      Right lower leg: No edema        Left lower leg: No edema    Skin:     General: Skin is warm  Coloration: Skin is not pale  Findings: No erythema or rash  Neurological:      General: No focal deficit present  Mental Status: He is alert and oriented to person, place, and time  Psychiatric:         Mood and Affect: Mood normal          Behavior: Behavior normal          Thought Content: Thought content normal             Additional Data:     Labs:  Results from last 7 days   Lab Units 06/25/22  0513 06/24/22  0535 06/23/22 2024   WBC Thousand/uL 5 10  --  5 15   HEMOGLOBIN g/dL 15 8   < > 15 9   HEMATOCRIT % 45 3   < > 46 4   PLATELETS Thousands/uL 202   < > 225   NEUTROS PCT %  --   --  66   LYMPHS PCT %  --   --  23   MONOS PCT %  --   --  8   EOS PCT %  --   --  1    < > = values in this interval not displayed       Results from last 7 days   Lab Units 06/25/22  0513   SODIUM mmol/L 142   POTASSIUM mmol/L 4 3   CHLORIDE mmol/L 108   CO2 mmol/L 27   BUN mg/dL 13   CREATININE mg/dL 1 13   ANION GAP mmol/L 7   CALCIUM mg/dL 9 3   ALBUMIN g/dL 4 1   TOTAL BILIRUBIN mg/dL 2 01*   ALK PHOS U/L 42   ALT U/L 23   AST U/L 16   GLUCOSE RANDOM mg/dL 102     Results from last 7 days   Lab Units 06/23/22 2024   INR  0 93             Results from last 7 days   Lab Units 06/23/22 2024   LACTIC ACID mmol/L 0 7       Lines/Drains:  Invasive Devices  Report    Peripheral Intravenous Line  Duration           Peripheral IV 06/23/22 Right Antecubital 1 day                      Imaging: Reviewed radiology reports from this admission including: chest CT scan, abdominal/pelvic CT and MRI abdomen/MRCP    Recent Cultures (last 7 days):   Results from last 7 days   Lab Units 06/23/22 2025   URINE CULTURE  <10,000 cfu/ml        Last 24 Hours Medication List:   Current Facility-Administered Medications   Medication Dose Route Frequency Provider Last Rate    acetaminophen  650 mg Oral Q6H PRN King Dodge MD      ondansetron  4 mg Intravenous Q6H PRN Camila Mcdaniels MD Today, Patient Was Seen By: Mikki Izaguirre MD    **Please Note: This note may have been constructed using a voice recognition system  **

## 2022-06-26 PROCEDURE — 99232 SBSQ HOSP IP/OBS MODERATE 35: CPT | Performed by: INTERNAL MEDICINE

## 2022-06-26 NOTE — PROGRESS NOTES
[facility]  Progress Note Javielaine Kaur 27 1975, 55 y o  male MRN: [id number]  Unit/Bed#: S -01 Encounter: [id number]  Primary Care Provider: Cammy Bryant MD   Date and time admitted to hospital: 6/23/2022  7:56 PM    * Mass of right kidney  Assessment & Plan  Painless hematuria started on the day of admission with passing 2 clots  Eventually resolved  No other urinary symptoms  CT scan of abdomen demonstrated large complex mass in the right kidney highly suspicious for malignancy  Urology on board  MRI:  Right renal mass  Our urologist initially recommended transfer to [facility] for nephrectomy and biopsy  However, patient prefers to be transferred and be managed at [facility]   Thus this was coordinated 6/24 by our advanced practitioner for Urology with the patient access staff  Patient was accepted by Dr Trista Ley at [facility]   6/25, spoke to patient access staff, transfer likely will happen on Monday as [facility] medical insurance authorization  Spoke to the patient about this and okay with the plan  Painless hematuria  Assessment & Plan  As above  resolved    Mixed hyperlipidemia  Assessment & Plan  Not taking statins anymore  Elevated bilirubin  Assessment & Plan  As per discussion with the patient, chronic  Patient also had elevated bilirubin in 2019  More of indirect hyperbilirubinemia  Asymptomatic  Thus possibly Gilbert's syndrome  Possibly reactive  Denies any GI symptoms  This was discussed with the patient  Monitor prn  VTE Pharmacologic Prophylaxis: VTE Score: 4 Moderate Risk (Score 3-4) - Pharmacological DVT Prophylaxis Contraindicated  Sequential Compression Devices Ordered  Patient Centered Rounds: I performed bedside rounds with nursing staff today  Discussions with Specialists or Other Care Team Provider:  Case management      Education and Discussions with Family / Patient: Updated  (wife) at bedside  Time Spent for Care: 30 minutes  More than 50% of total time spent on counseling and coordination of care as described above  Current Length of Stay: 2 day(s)  Current Patient Status: Inpatient   Certification Statement: The patient will continue to require additional inpatient hospital stay due to Above findings and plans  Discharge Plan: Anticipate discharge tomorrow to [facility]     Code Status: Level 1 - Full Code    Subjective:   Patient is doing fine  No complaints  No gross hematuria  Denies any pains  No shortness of breath  Objective:     Vitals:   Temp (24hrs), Av 9 °F (36 6 °C), Min:97 7 °F (36 5 °C), Max:98 2 °F (36 8 °C)    Temp:  [97 7 °F (36 5 °C)-98 2 °F (36 8 °C)] 97 7 °F (36 5 °C)  HR:  [78] 78  Resp:  [18] 18  BP: (104-129)/(68-86) 129/86  SpO2:  [94 %-100 %] 100 %  There is no height or weight on file to calculate BMI  Input and Output Summary (last 24 hours): Intake/Output Summary (Last 24 hours) at 2022 1418  Last data filed at 2022 1750  Gross per 24 hour   Intake 240 ml   Output 850 ml   Net -610 ml       Physical Exam:   Physical Exam  Vitals and nursing note reviewed  Exam conducted with a chaperone present  Constitutional:       General: He is not in acute distress  Appearance: Normal appearance  He is not ill-appearing, toxic-appearing or diaphoretic  Cardiovascular:      Rate and Rhythm: Normal rate and regular rhythm  Heart sounds: Normal heart sounds  Pulmonary:      Effort: Pulmonary effort is normal  No respiratory distress  Breath sounds: Normal breath sounds  Abdominal:      General: Bowel sounds are normal  There is no distension  Palpations: Abdomen is soft  Tenderness: There is no abdominal tenderness  There is no right CVA tenderness or left CVA tenderness  Musculoskeletal:      Right lower leg: No edema  Left lower leg: No edema  Skin:     General: Skin is warm  Coloration: Skin is not pale  Findings: No erythema or rash  Neurological:      General: No focal deficit present  Mental Status: He is alert  Psychiatric:         Mood and Affect: Mood normal          Behavior: Behavior normal          Thought Content: Thought content normal             Additional Data:     Labs:  Results from last 7 days   Lab Units 06/25/22  0513 06/24/22  0535 06/23/22 2024   WBC Thousand/uL 5 10  --  5 15   HEMOGLOBIN g/dL 15 8   < > 15 9   HEMATOCRIT % 45 3   < > 46 4   PLATELETS Thousands/uL 202   < > 225   NEUTROS PCT %  --   --  66   LYMPHS PCT %  --   --  23   MONOS PCT %  --   --  8   EOS PCT %  --   --  1    < > = values in this interval not displayed       Results from last 7 days   Lab Units 06/25/22  0513   SODIUM mmol/L 142   POTASSIUM mmol/L 4 3   CHLORIDE mmol/L 108   CO2 mmol/L 27   BUN mg/dL 13   CREATININE mg/dL 1 13   ANION GAP mmol/L 7   CALCIUM mg/dL 9 3   ALBUMIN g/dL 4 1   TOTAL BILIRUBIN mg/dL 2 01*   ALK PHOS U/L 42   ALT U/L 23   AST U/L 16   GLUCOSE RANDOM mg/dL 102     Results from last 7 days   Lab Units 06/23/22 2024   INR  0 93             Results from last 7 days   Lab Units 06/23/22 2024   LACTIC ACID mmol/L 0 7       Lines/Drains:  Invasive Devices  Report    Peripheral Intravenous Line  Duration           Peripheral IV 06/23/22 Right Antecubital 2 days                      Imaging: Reviewed radiology reports from this admission including: chest CT scan, abdominal/pelvic CT and MRI abdomen/MRCP    Recent Cultures (last 7 days):   Results from last 7 days   Lab Units 06/23/22 2025   URINE CULTURE  <10,000 cfu/ml        Last 24 Hours Medication List:   Current Facility-Administered Medications   Medication Dose Route Frequency Provider Last Rate    acetaminophen  650 mg Oral Q6H PRN King Dodge MD      ondansetron  4 mg Intravenous Q6H PRN Kofi Portillo MD          Today, Patient Was Seen By: Sudheer Feliciano Ángela Mendoza MD    **Please Note: This note may have been constructed using a voice recognition system  **

## 2022-06-26 NOTE — UTILIZATION REVIEW
Continued Stay Review    Date: 6/26/22                         Current Patient Class: inpatient  Current Level of Care: med surg    HPI:46 y o  male initially admitted on 6/23/22 to observation and converted to inpatient due to Mass of Right Kidney  Presented due to hematuria  Ct abdomen showed large complex mass of Right kidney  Malignancy is suspected  Urology recommends nephrectomy and biopsy  Assessment/Plan:  6/26/22 Hematuria resolved  Exam non focal  Awaiting transfer to [facility] under service of Dr Randy Moss  Anticipate will need authorization and not done on weekend  Vital Signs:   06/26/22 1447 97 8 °F (36 6 °C) 72 18 132/80 -- 100 % None (Room air) Lying   06/26/22 07:43:39 97 7 °F (36 5 °C) -- 18 129/86 100 100 % -- --   06/25/22 22:07:06 97 9 °F (36 6 °C) -- -- 104/71 82 --        06/24 0701   06/25 0700 06/25 0701   06/26 0700 06/26 0701   06/27 0700   P  O   684    Total Intake(mL/kg)  684 (7 7)    Urine (mL/kg/hr)  850 (0 4)    Total Output  850    Net  -166        Pertinent Labs/Diagnostic Results:   MRI abdomen w wo contrast   Final Result by Fallon Basilio DO (06/24 1052)      Large 13 cm solid and cystic right renal mass touching upon adjacent structures as above  Although conceivably this could represent an oncocytoma given absence of metastatic disease or enlarged lymphadenopathy as well as lack of aggressive local    features, however, renal cell carcinoma would look identical and cannot be distinguished by imaging alone  Surgical resection is warranted; presumed cancer until proven otherwise  Additional incidental findings as above  Workstation performed: [id number]         CT chest without contrast   Final Result by Fariba Nathan DO (06/23 5148)      No evidence of focal consolidation      Right renal mass as described on prior CT scan of the abdomen and pelvis from earlier today likely representing malignancy    Follow-up with MRI of the kidneys is recommended with contrast               Workstation performed: [id number]         CT abdomen pelvis wo contrast   Final Result by Krista Black DO (06/23 2115)      Large complex mass in the right kidney highly suspicious for malignancy    Recommend MRI of the kidneys with contrast for further evaluation             I personally discussed this study with Gary Lambert on 6/23/2022 at 9:05 PM                      Workstation performed: [id number]           Results from last 7 days   Lab Units 06/24/22  1649   SARS-COV-2  Negative     Results from last 7 days   Lab Units 06/25/22  0513 06/24/22  0535 06/23/22 2024   WBC Thousand/uL 5 10  --  5 15   HEMOGLOBIN g/dL 15 8 15 0  15 0 15 9   HEMATOCRIT % 45 3 43 2  43 1 46 4   PLATELETS Thousands/uL 202 215 225   NEUTROS ABS Thousands/µL  --   --  3 39     Results from last 7 days   Lab Units 06/25/22  0513 06/24/22  0535 06/23/22 2024   SODIUM mmol/L 142 140 140   POTASSIUM mmol/L 4 3 3 7 3 8   CHLORIDE mmol/L 108 108 105   CO2 mmol/L 27 26 29   ANION GAP mmol/L 7 6 6   BUN mg/dL 13 11 14   CREATININE mg/dL 1 13 1 01 1 05   EGFR ml/min/1 73sq m 77 88 84   CALCIUM mg/dL 9 3 9 2 9 1     Results from last 7 days   Lab Units 06/25/22  0513 06/24/22  0535 06/23/22 2024   AST U/L 16 15 17   ALT U/L 23 23 27   ALK PHOS U/L 42 41 48   TOTAL PROTEIN g/dL 6 4 6 4 7 2   ALBUMIN g/dL 4 1 4 1 4 5   TOTAL BILIRUBIN mg/dL 2 01* 1 63* 1 49*   BILIRUBIN DIRECT mg/dL  --  0 15  --      Results from last 7 days   Lab Units 06/25/22  0513 06/24/22  0535 06/23/22 2024   GLUCOSE RANDOM mg/dL 102 150* 118     Results from last 7 days   Lab Units 06/23/22 2024   CK TOTAL U/L 105     Results from last 7 days   Lab Units 06/23/22 2024   PROTIME seconds 12 5   INR  0 93   PTT seconds 29     Results from last 7 days   Lab Units 06/23/22 2024   LACTIC ACID mmol/L 0 7     Results from last 7 days   Lab Units 06/23/22 2024   LIPASE u/L 87*     Results from last 7 days   Lab Units 06/23/22 2025   CLARITY UA  Cloudy   COLOR UA  Bloody   SPEC GRAV UA  1 020   PH UA  6 5   GLUCOSE UA mg/dl Negative   KETONES UA mg/dl Negative   BLOOD UA  Large*   PROTEIN UA mg/dl >=300*   NITRITE UA  Negative   BILIRUBIN UA  Negative   UROBILINOGEN UA E U /dl 0 2   LEUKOCYTES UA  Negative   WBC UA /hpf Field obscured, unable to enumerate*   RBC UA /hpf Innumerable*   BACTERIA UA /hpf None Seen   EPITHELIAL CELLS WET PREP /hpf None Seen     Results from last 7 days   Lab Units 06/24/22  1649   INFLUENZA A PCR  Negative   INFLUENZA B PCR  Negative   RSV PCR  Negative     Results from last 7 days   Lab Units 06/23/22 2025   URINE CULTURE  <10,000 cfu/ml        Medications:   Scheduled Medications:     Continuous IV Infusions:     PRN Meds: not used   acetaminophen, 650 mg, Oral, Q6H PRN  ondansetron, 4 mg, Intravenous, Q6H PRN        Discharge Plan: to be determined     Network Utilization Review Department  ATTENTION: Please call with any questions or concerns to [phone] and carefully listen to the prompts so that you are directed to the right person  All voicemails are confidential   Guille Herrera all requests for admission clinical reviews, approved or denied determinations and any other requests to dedicated fax number below belonging to the campus where the patient is receiving treatment   List of dedicated fax numbers for the Facilities:  [address] DENIALS (Administrative/Medical Necessity) [phone]   [address] (Maternity/NICU/Pediatrics) [phone] [address] [phone] [address] [facility] [place] [address] [address] [facility] [phone] 187 [facility] [address] Cheri Meza [address] [place] [address] [phone]

## 2022-06-26 NOTE — ASSESSMENT & PLAN NOTE
Painless hematuria started on the day of admission with passing 2 clots  Eventually resolved  No other urinary symptoms  CT scan of abdomen demonstrated large complex mass in the right kidney highly suspicious for malignancy  Urology on board  MRI:  Right renal mass  Our urologist initially recommended transfer to [facility] for nephrectomy and biopsy  However, patient prefers to be transferred and be managed at [facility]   Thus this was coordinated 6/24 by our advanced practitioner for Urology with the patient access staff  Patient was accepted by Dr Jeanette Dolan at [facility]   6/25, spoke to patient access staff, transfer likely will happen on Monday as [facility] medical insurance authorization  Spoke to the patient about this and okay with the plan

## 2022-06-27 VITALS
SYSTOLIC BLOOD PRESSURE: 139 MMHG | RESPIRATION RATE: 16 BRPM | OXYGEN SATURATION: 96 % | DIASTOLIC BLOOD PRESSURE: 88 MMHG | HEART RATE: 64 BPM | TEMPERATURE: 97.7 F

## 2022-06-27 PROCEDURE — 99239 HOSP IP/OBS DSCHRG MGMT >30: CPT | Performed by: INTERNAL MEDICINE

## 2022-06-27 RX ORDER — ACETAMINOPHEN 325 MG/1
650 TABLET ORAL EVERY 6 HOURS PRN
Refills: 0
Start: 2022-06-27

## 2022-06-27 NOTE — ASSESSMENT & PLAN NOTE
Painless hematuria started on the day of admission with passing 2 clots  Eventually resolved  No other urinary symptoms  CT scan of abdomen demonstrated large complex mass in the right kidney highly suspicious for malignancy  Urology on board  MRI:  "Large 13 cm solid and cystic right renal mass touching upon adjacent structures "   Our urologist initially recommended transfer to [facility] for nephrectomy and biopsy  However, patient prefers to be transferred and be managed at [facility]   Thus this was coordinated 6/24 by our advanced practitioner for Urology with the patient access staff  Patient was accepted by Dr Blanca Pace at [facility]  Today, patient was approved to be transferred to [facility]  As per discussion with the patient, he already has the disc of the imaging studies that were done here  Per discussion with him, no need to print out the notes, as he has them in his [address] [facility] have the same EHR, epic

## 2022-06-27 NOTE — DISCHARGE SUMMARY
[facility]  Discharge- [facility] Karolynciera 27 1975, 55 y o  male MRN: [id number]  Unit/Bed#: S -01 Encounter: [id number]  Primary Care Provider: Geri Llamas MD   Date and time admitted to hospital: 6/23/2022  7:56 PM    * Mass of right kidney  Assessment & Plan  Painless hematuria started on the day of admission with passing 2 clots  Eventually resolved  No other urinary symptoms  CT scan of abdomen demonstrated large complex mass in the right kidney highly suspicious for malignancy  Urology on board  MRI:  "Large 13 cm solid and cystic right renal mass touching upon adjacent structures "   Our urologist initially recommended transfer to [facility] for nephrectomy and biopsy  However, patient prefers to be transferred and be managed at [facility]   Thus this was coordinated 6/24 by our advanced practitioner for Urology with the patient access staff  Patient was accepted by Dr Rylie Baez at [facility]  Today, patient was approved to be transferred to [facility]  As per discussion with the patient, he already has the disc of the imaging studies that were done here  Per discussion with him, no need to print out the notes, as he has them in his [address] [place] [address] [facility] have the same EHR, epic  Painless hematuria  Assessment & Plan  As above  Resolved  For transfer to [facility]  Mixed hyperlipidemia  Assessment & Plan  Not taking statins anymore  For transfer to [facility] today  Elevated bilirubin  Assessment & Plan  As per discussion with the patient, chronic  Patient also had elevated bilirubin in 2019  More of indirect hyperbilirubinemia  Asymptomatic  Thus possibly Gilbert's syndrome  Possibly reactive  Denies any GI symptoms  This was discussed with the patient  Monitor prn  For transfer to [facility] today  I am recommending rechecking CMP/LFTs at [facility]          Discharging Physician / Practitioner: Hernandez Restrepo Liberty Mendiola MD  PCP: Rakan Lindsey MD  Admission Date:   Admission Orders (From admission, onward)     Ordered        06/24/22 1041  Inpatient Admission  Once            06/23/22 2219  Place in Observation  Once                      Discharge Date: 06/27/22    Consultations During Hospital Stay:  · Nephrologist     Procedures Performed:   · Please see diagnosis and notes above  Significant Findings / Test Results:   · Please see diagnosis and notes above  Incidental Findings:   · None  Test Results Pending at Discharge (will require follow up): · None  Outpatient Tests Requested:  · None  Complications:  None  Reason for Admission:  Hematuria  Hospital Course:   Stan Nation is a 55 y o  male patient who originally presented to the hospital on 6/23/2022 due to hematuria  On this admission, imaging studies revealed a right large complex renal mass  Urologist was on board  Initially plan was to transfer to [address] [place] [address] for biopsy and nephrectomy  However, patient prefers to be transferred and managed at [facility]   Thus this was coordinated and was approved today  Patient was accepted by Dr Loren Burrell at [facility]   Patient's condition improved here  Patient's hematuria had resolved  Patient denies any pains  Please see above list of diagnoses and related plan for additional information  Condition at Discharge: stable    Discharge Day Visit / Exam:   Subjective:    Patient is doing fine  Patient denies any blood in the urine or any bleeding  Patient denies any abdominal or flank pains or any other pains  No shortness of breath  No nausea or vomiting  Patient is okay with his transfer to [facility] for further evaluation and management        Vitals: Blood Pressure: 139/88 (06/27/22 0713)  Pulse: 64 (06/26/22 2210)  Temperature: 97 7 °F (36 5 °C) (06/27/22 0713)  Temp Source: Oral (06/26/22 1447)  Respirations: 16 (06/27/22 2796)  SpO2: 95 % (06/26/22 3160)  Exam:   Physical Exam  Vitals and nursing note reviewed  Exam conducted with a chaperone present  Constitutional:       General: He is not in acute distress  Appearance: Normal appearance  He is not ill-appearing, toxic-appearing or diaphoretic  Cardiovascular:      Rate and Rhythm: Normal rate and regular rhythm  Heart sounds: Normal heart sounds  Pulmonary:      Effort: Pulmonary effort is normal  No respiratory distress  Breath sounds: Normal breath sounds  Abdominal:      General: Bowel sounds are normal  There is no distension  Palpations: Abdomen is soft  Tenderness: There is no abdominal tenderness  There is no right CVA tenderness, left CVA tenderness or guarding  Musculoskeletal:      Right lower leg: No edema  Left lower leg: No edema  Skin:     General: Skin is warm  Coloration: Skin is not pale  Findings: No erythema or rash  Neurological:      General: No focal deficit present  Mental Status: He is alert and oriented to person, place, and time  Psychiatric:         Mood and Affect: Mood normal          Behavior: Behavior normal          Thought Content: Thought content normal             Discussion with Family: Updated  (wife) at bedside  Discharge instructions/Information to patient and family:   See after visit summary for information provided to patient and family  Provisions for Follow-Up Care:  See after visit summary for information related to follow-up care and any pertinent home health orders  Disposition:   [address] Transfer to [facility]    Planned Readmission:  Readmission at [facility]      Discharge Statement:  I spent 45 minutes discharging the patient  This time was spent on the day of discharge  I had direct contact with the patient on the day of discharge   Greater than 50% of the total time was spent examining patient, answering all patient questions, arranging and discussing plan of care with patient as well as directly providing post-discharge instructions  Additional time then spent on discharge activities  Discharge Medications:  See after visit summary for reconciled discharge medications provided to patient and/or family        **Please Note: This note may have been constructed using a voice recognition system**

## 2022-06-27 NOTE — ASSESSMENT & PLAN NOTE
As per discussion with the patient, chronic  Patient also had elevated bilirubin in 2019  More of indirect hyperbilirubinemia  Asymptomatic  Thus possibly Gilbert's syndrome  Possibly reactive  Denies any GI symptoms  This was discussed with the patient  Monitor prn  For transfer to [facility] today  I am recommending rechecking CMP/LFTs at [facility]

## 2022-06-27 NOTE — UTILIZATION REVIEW
Continued Stay Review    Date: 6/27/22                          Current Patient Class:  IP   Current Level of Care:  MS    HPI:46 y o  male initially admitted on 6/23 as OBS converted to IP 6/24 with hematuria found to have large complex R kidney mass  Malignancy suspected  Urology recommends nephrectomy and bio[psy  Pt desires to be transferred to [facility] rather than transfer to [facility]   Patient was accepted by Dr Casa Herrera at [place] [address] 6/25- [facility] medical insurance authorization  Transfer likely will happen on Monday 6/27/22  Hematuria resolved        Assessment/Plan: 6/27  D/C order placed-pt to transfer to other hospital     Vital Signs:   Date/Time Temp Pulse Resp BP MAP (mmHg) SpO2   06/27/22 07:13:43 97 7 °F (36 5 °C) -- 16 139/88 105 --   06/26/22 22:10:22 97 7 °F (36 5 °C) 64 -- 124/80 95 95 %   06/26/22 1447 97 8 °F (36 6 °C) 72 18 132/80 -- 100 %       Pertinent Labs/Diagnostic Results:   Results from last 7 days   Lab Units 06/24/22  1649   SARS-COV-2  Negative     Results from last 7 days   Lab Units 06/25/22  0513 06/24/22  0535 06/23/22 2024   WBC Thousand/uL 5 10  --  5 15   HEMOGLOBIN g/dL 15 8 15 0  15 0 15 9   HEMATOCRIT % 45 3 43 2  43 1 46 4   PLATELETS Thousands/uL 202 215 225   NEUTROS ABS Thousands/µL  --   --  3 39         Results from last 7 days   Lab Units 06/25/22  0513 06/24/22  0535 06/23/22 2024   SODIUM mmol/L 142 140 140   POTASSIUM mmol/L 4 3 3 7 3 8   CHLORIDE mmol/L 108 108 105   CO2 mmol/L 27 26 29   ANION GAP mmol/L 7 6 6   BUN mg/dL 13 11 14   CREATININE mg/dL 1 13 1 01 1 05   EGFR ml/min/1 73sq m 77 88 84   CALCIUM mg/dL 9 3 9 2 9 1     Results from last 7 days   Lab Units 06/25/22  0513 06/24/22  0535 06/23/22 2024   AST U/L 16 15 17   ALT U/L 23 23 27   ALK PHOS U/L 42 41 48   TOTAL PROTEIN g/dL 6 4 6 4 7 2   ALBUMIN g/dL 4 1 4 1 4 5   TOTAL BILIRUBIN mg/dL 2 01* 1 63* 1 49*   BILIRUBIN DIRECT mg/dL  --  0 15  --          Results from last 7 days Lab Units 06/25/22  0513 06/24/22  0535 06/23/22 2024   GLUCOSE RANDOM mg/dL 102 150* 118               Results from last 7 days   Lab Units 06/23/22 2024   CK TOTAL U/L 105             Results from last 7 days   Lab Units 06/23/22 2024   PROTIME seconds 12 5   INR  0 93   PTT seconds 29             Results from last 7 days   Lab Units 06/23/22 2024   LACTIC ACID mmol/L 0 7                         Results from last 7 days   Lab Units 06/23/22 2024   LIPASE u/L 87*                 Results from last 7 days   Lab Units 06/23/22 2025   CLARITY UA  Cloudy   COLOR UA  Bloody   SPEC GRAV UA  1 020   PH UA  6 5   GLUCOSE UA mg/dl Negative   KETONES UA mg/dl Negative   BLOOD UA  Large*   PROTEIN UA mg/dl >=300*   NITRITE UA  Negative   BILIRUBIN UA  Negative   UROBILINOGEN UA E U /dl 0 2   LEUKOCYTES UA  Negative   WBC UA /hpf Field obscured, unable to enumerate*   RBC UA /hpf Innumerable*   BACTERIA UA /hpf None Seen   EPITHELIAL CELLS WET PREP /hpf None Seen     Results from last 7 days   Lab Units 06/24/22  1649   INFLUENZA A PCR  Negative   INFLUENZA B PCR  Negative   RSV PCR  Negative                             Results from last 7 days   Lab Units 06/23/22 2025   URINE CULTURE  <10,000 cfu/ml                  Medications:   Scheduled Medications:     Continuous IV Infusions:     PRN Meds:  acetaminophen, 650 mg, Oral, Q6H PRN  ondansetron, 4 mg, Intravenous, Q6H PRN        Discharge Plan:D    Network Utilization Review Department  ATTENTION: Please call with any questions or concerns to [phone] and carefully listen to the prompts so that you are directed to the right person  All voicemails are confidential   Jett Reynoso all requests for admission clinical reviews, approved or denied determinations and any other requests to dedicated fax number below belonging to the campus where the patient is receiving treatment   List of dedicated fax numbers for the Facilities:  [address] DENIALS (Administrative/Medical Necessity) [phone]   [address] (Maternity/NICU/Pediatrics) 261 [facility] Floor [facility] 40 125 [facility]  [phone]   [address] [id number] [place] [address] [address] [facility] [address] [phone]

## 2022-06-28 NOTE — PLAN OF CARE
Problem: GENITOURINARY - ADULT  Goal: Maintains or returns to baseline urinary function  Description: INTERVENTIONS:  - Assess urinary function  - Encourage oral fluids to ensure adequate hydration if ordered  - Administer IV fluids as ordered to ensure adequate hydration  - Administer ordered medications as needed  - Offer frequent toileting  - Follow urinary retention protocol if ordered  Outcome: Progressing     Problem: HEMATOLOGIC - ADULT  Goal: Maintains hematologic stability  Description: INTERVENTIONS  - Assess for signs and symptoms of bleeding or hemorrhage  - Monitor labs  - Administer supportive blood products/factors as ordered and appropriate  Outcome: Progressing     Problem: Knowledge Deficit  Goal: Patient/family/caregiver demonstrates understanding of disease process, treatment plan, medications, and discharge instructions  Description: Complete learning assessment and assess knowledge base    Interventions:  - Provide teaching at level of understanding  - Provide teaching via preferred learning methods  Outcome: Progressing

## 2023-12-21 ENCOUNTER — HOSPITAL ENCOUNTER (OUTPATIENT)
Dept: RADIOLOGY | Facility: IMAGING CENTER | Age: 48
End: 2023-12-21
Payer: COMMERCIAL

## 2023-12-21 DIAGNOSIS — C64.9 MALIGNANT NEOPLASM OF UNSPECIFIED KIDNEY, EXCEPT RENAL PELVIS (HCC): ICD-10-CM

## 2023-12-21 PROCEDURE — A9585 GADOBUTROL INJECTION: HCPCS | Performed by: RADIOLOGY

## 2023-12-21 PROCEDURE — 70553 MRI BRAIN STEM W/O & W/DYE: CPT

## 2023-12-21 PROCEDURE — G1004 CDSM NDSC: HCPCS

## 2023-12-21 RX ORDER — GADOBUTROL 604.72 MG/ML
8.5 INJECTION INTRAVENOUS
Status: COMPLETED | OUTPATIENT
Start: 2023-12-21 | End: 2023-12-21

## 2023-12-21 RX ADMIN — GADOBUTROL 8.5 ML: 604.72 INJECTION INTRAVENOUS at 08:51
